# Patient Record
Sex: FEMALE | Race: WHITE | NOT HISPANIC OR LATINO
[De-identification: names, ages, dates, MRNs, and addresses within clinical notes are randomized per-mention and may not be internally consistent; named-entity substitution may affect disease eponyms.]

---

## 2017-09-08 ENCOUNTER — APPOINTMENT (OUTPATIENT)
Dept: FAMILY MEDICINE | Facility: CLINIC | Age: 72
End: 2017-09-08
Payer: MEDICARE

## 2017-09-08 VITALS
DIASTOLIC BLOOD PRESSURE: 60 MMHG | WEIGHT: 197 LBS | SYSTOLIC BLOOD PRESSURE: 128 MMHG | BODY MASS INDEX: 29.86 KG/M2 | HEIGHT: 68 IN

## 2017-09-08 DIAGNOSIS — Z86.39 PERSONAL HISTORY OF OTHER ENDOCRINE, NUTRITIONAL AND METABOLIC DISEASE: ICD-10-CM

## 2017-09-08 DIAGNOSIS — Z87.891 PERSONAL HISTORY OF NICOTINE DEPENDENCE: ICD-10-CM

## 2017-09-08 DIAGNOSIS — Z86.79 PERSONAL HISTORY OF OTHER DISEASES OF THE CIRCULATORY SYSTEM: ICD-10-CM

## 2017-09-08 PROCEDURE — 99203 OFFICE O/P NEW LOW 30 MIN: CPT

## 2017-11-20 ENCOUNTER — MEDICATION RENEWAL (OUTPATIENT)
Age: 72
End: 2017-11-20

## 2017-12-18 ENCOUNTER — APPOINTMENT (OUTPATIENT)
Dept: FAMILY MEDICINE | Facility: CLINIC | Age: 72
End: 2017-12-18
Payer: MEDICARE

## 2017-12-18 VITALS
BODY MASS INDEX: 29.1 KG/M2 | DIASTOLIC BLOOD PRESSURE: 80 MMHG | SYSTOLIC BLOOD PRESSURE: 160 MMHG | WEIGHT: 192 LBS | HEIGHT: 68 IN

## 2017-12-18 PROCEDURE — 36415 COLL VENOUS BLD VENIPUNCTURE: CPT

## 2017-12-18 PROCEDURE — 99214 OFFICE O/P EST MOD 30 MIN: CPT | Mod: 25

## 2017-12-20 ENCOUNTER — MEDICATION RENEWAL (OUTPATIENT)
Age: 72
End: 2017-12-20

## 2017-12-25 ENCOUNTER — RX RENEWAL (OUTPATIENT)
Age: 72
End: 2017-12-25

## 2017-12-26 ENCOUNTER — MEDICATION RENEWAL (OUTPATIENT)
Age: 72
End: 2017-12-26

## 2017-12-28 ENCOUNTER — RX RENEWAL (OUTPATIENT)
Age: 72
End: 2017-12-28

## 2017-12-28 ENCOUNTER — MEDICATION RENEWAL (OUTPATIENT)
Age: 72
End: 2017-12-28

## 2018-01-08 ENCOUNTER — TRANSCRIPTION ENCOUNTER (OUTPATIENT)
Age: 73
End: 2018-01-08

## 2018-03-11 ENCOUNTER — MEDICATION RENEWAL (OUTPATIENT)
Age: 73
End: 2018-03-11

## 2018-03-19 ENCOUNTER — RX RENEWAL (OUTPATIENT)
Age: 73
End: 2018-03-19

## 2018-03-20 ENCOUNTER — APPOINTMENT (OUTPATIENT)
Dept: FAMILY MEDICINE | Facility: CLINIC | Age: 73
End: 2018-03-20
Payer: MEDICARE

## 2018-03-20 ENCOUNTER — MEDICATION RENEWAL (OUTPATIENT)
Age: 73
End: 2018-03-20

## 2018-03-20 VITALS
BODY MASS INDEX: 29.25 KG/M2 | SYSTOLIC BLOOD PRESSURE: 138 MMHG | WEIGHT: 193 LBS | HEIGHT: 68 IN | DIASTOLIC BLOOD PRESSURE: 70 MMHG

## 2018-03-20 PROCEDURE — 36415 COLL VENOUS BLD VENIPUNCTURE: CPT

## 2018-03-20 PROCEDURE — 99214 OFFICE O/P EST MOD 30 MIN: CPT | Mod: 25

## 2018-03-20 RX ORDER — HYDROCHLOROTHIAZIDE 12.5 MG/1
12.5 CAPSULE ORAL DAILY
Qty: 30 | Refills: 0 | Status: DISCONTINUED | COMMUNITY
Start: 2017-12-20 | End: 2018-03-20

## 2018-03-21 ENCOUNTER — RX RENEWAL (OUTPATIENT)
Age: 73
End: 2018-03-21

## 2018-03-23 ENCOUNTER — RX RENEWAL (OUTPATIENT)
Age: 73
End: 2018-03-23

## 2018-03-24 ENCOUNTER — RX RENEWAL (OUTPATIENT)
Age: 73
End: 2018-03-24

## 2018-03-28 ENCOUNTER — TRANSCRIPTION ENCOUNTER (OUTPATIENT)
Age: 73
End: 2018-03-28

## 2018-04-24 ENCOUNTER — RX RENEWAL (OUTPATIENT)
Age: 73
End: 2018-04-24

## 2018-05-30 ENCOUNTER — RX RENEWAL (OUTPATIENT)
Age: 73
End: 2018-05-30

## 2018-06-18 ENCOUNTER — RX RENEWAL (OUTPATIENT)
Age: 73
End: 2018-06-18

## 2018-06-18 ENCOUNTER — MEDICATION RENEWAL (OUTPATIENT)
Age: 73
End: 2018-06-18

## 2018-06-25 ENCOUNTER — APPOINTMENT (OUTPATIENT)
Dept: FAMILY MEDICINE | Facility: CLINIC | Age: 73
End: 2018-06-25
Payer: MEDICARE

## 2018-06-25 VITALS
SYSTOLIC BLOOD PRESSURE: 140 MMHG | WEIGHT: 193 LBS | BODY MASS INDEX: 29.25 KG/M2 | HEIGHT: 68 IN | DIASTOLIC BLOOD PRESSURE: 90 MMHG

## 2018-06-25 PROCEDURE — 36415 COLL VENOUS BLD VENIPUNCTURE: CPT

## 2018-06-25 PROCEDURE — 99214 OFFICE O/P EST MOD 30 MIN: CPT | Mod: 25,Q5

## 2018-06-25 NOTE — ASSESSMENT
[FreeTextEntry1] : Patient had a diagnosis of thyroid disorder. Blood was drawn to assess levels of TSH and T4. Patient will continue on current dose of medications at this time unless instructed otherwise. Patient was advised to take thyroid medications on a empty stomach and to wait at least 45 minutes before eating for appropriate absorption.\par \par The patient has a diagnosis of hypertension. Blood work was drawn in office and will be followed.  The diagnosis was discussed with patient and need for medication compliance and possible side affects and risks of noncompliance. Patient was told to adhere to a low salt diet and try to incorporate exercise daily.\par \par The diagnosis of diabetes is established with this patient.  Blood work was drawn in office and results will be reviewed and followed. The patient was counseled on using a low sugar and carbohydrate diet.  Patient was advised to eat small meals and exercise regularly. Patient as advised to take all medications as prescribed and to follow up with yearly podiatry and opthalmology visits. Patient was advised to call office  or go to the ER immediately if experiences any nausea, lightheadedness or for any other issues.\par \par Smoking cessation was discussed with patient. All options were explained to the patient including available medications and their mechanism of action. Side affects and black box warnings were discussed as well. Patient was counseled on risks and benefits of available medications and risks of continued smoking including death. Greater than 5 minutes spent counseling patient.\par

## 2018-06-25 NOTE — PHYSICAL EXAM
[Well Nourished] : well nourished [Normal Oropharynx] : the oropharynx was normal [Supple] : supple [Clear to Auscultation] : lungs were clear to auscultation bilaterally [Regular Rhythm] : with a regular rhythm [Normal S1, S2] : normal S1 and S2 [Normal Anterior Cervical Nodes] : no anterior cervical lymphadenopathy [No CVA Tenderness] : no CVA  tenderness [No Rash] : no rash [Normal Gait] : normal gait [Normal Insight/Judgement] : insight and judgment were intact

## 2018-06-25 NOTE — HEALTH RISK ASSESSMENT
[No falls in past year] : Patient reported no falls in the past year [0] : 2) Feeling down, depressed, or hopeless: Not at all (0) [] : No [[id number]] : 0

## 2018-06-25 NOTE — HISTORY OF PRESENT ILLNESS
[de-identified] : 73 year old female is here for a followup visit. Patient is here for medication renewals and for blood work discussion. Medications and allergies were reviewed and assessed.  There has been no new medications since the last visit. Patient is feeling well with no active changes or issues since Her last visit.\par

## 2018-06-26 LAB
ALBUMIN SERPL ELPH-MCNC: 4.1 G/DL
ALP BLD-CCNC: 107 U/L
ALT SERPL-CCNC: 8 U/L
ANION GAP SERPL CALC-SCNC: 12 MMOL/L
AST SERPL-CCNC: 13 U/L
BASOPHILS # BLD AUTO: 0.02 K/UL
BASOPHILS NFR BLD AUTO: 0.3 %
BILIRUB SERPL-MCNC: <0.2 MG/DL
BUN SERPL-MCNC: 25 MG/DL
CALCIUM SERPL-MCNC: 9.3 MG/DL
CHLORIDE SERPL-SCNC: 111 MMOL/L
CHOLEST SERPL-MCNC: 220 MG/DL
CHOLEST/HDLC SERPL: 5.1 RATIO
CO2 SERPL-SCNC: 22 MMOL/L
CREAT SERPL-MCNC: 0.94 MG/DL
EOSINOPHIL # BLD AUTO: 0.05 K/UL
EOSINOPHIL NFR BLD AUTO: 0.6 %
GLUCOSE SERPL-MCNC: 103 MG/DL
HBA1C MFR BLD HPLC: 5.8 %
HCT VFR BLD CALC: 34 %
HDLC SERPL-MCNC: 43 MG/DL
HGB BLD-MCNC: 10.6 G/DL
IMM GRANULOCYTES NFR BLD AUTO: 0.3 %
LDLC SERPL CALC-MCNC: 150 MG/DL
LYMPHOCYTES # BLD AUTO: 1.61 K/UL
LYMPHOCYTES NFR BLD AUTO: 20.6 %
MAN DIFF?: NORMAL
MCHC RBC-ENTMCNC: 28.1 PG
MCHC RBC-ENTMCNC: 31.2 GM/DL
MCV RBC AUTO: 90.2 FL
MONOCYTES # BLD AUTO: 0.66 K/UL
MONOCYTES NFR BLD AUTO: 8.4 %
NEUTROPHILS # BLD AUTO: 5.47 K/UL
NEUTROPHILS NFR BLD AUTO: 69.8 %
PLATELET # BLD AUTO: 180 K/UL
POTASSIUM SERPL-SCNC: 4.3 MMOL/L
PROT SERPL-MCNC: 6.7 G/DL
RBC # BLD: 3.77 M/UL
RBC # FLD: 16.5 %
SODIUM SERPL-SCNC: 145 MMOL/L
T4 FREE SERPL-MCNC: 1.1 NG/DL
TRIGL SERPL-MCNC: 137 MG/DL
TSH SERPL-ACNC: 5.82 UIU/ML
WBC # FLD AUTO: 7.83 K/UL

## 2018-07-05 ENCOUNTER — APPOINTMENT (OUTPATIENT)
Dept: FAMILY MEDICINE | Facility: CLINIC | Age: 73
End: 2018-07-05

## 2018-07-13 ENCOUNTER — APPOINTMENT (OUTPATIENT)
Dept: FAMILY MEDICINE | Facility: CLINIC | Age: 73
End: 2018-07-13
Payer: MEDICARE

## 2018-07-13 VITALS
TEMPERATURE: 97.2 F | OXYGEN SATURATION: 96 % | SYSTOLIC BLOOD PRESSURE: 120 MMHG | WEIGHT: 193 LBS | BODY MASS INDEX: 29.25 KG/M2 | DIASTOLIC BLOOD PRESSURE: 76 MMHG | HEART RATE: 55 BPM | HEIGHT: 68 IN

## 2018-07-13 DIAGNOSIS — Z23 ENCOUNTER FOR IMMUNIZATION: ICD-10-CM

## 2018-07-13 LAB — CYTOLOGY CVX/VAG DOC THIN PREP: NORMAL

## 2018-07-13 PROCEDURE — 90670 PCV13 VACCINE IM: CPT | Mod: GY

## 2018-07-13 PROCEDURE — 99214 OFFICE O/P EST MOD 30 MIN: CPT | Mod: 25,Q5

## 2018-07-13 PROCEDURE — G0009: CPT

## 2018-07-13 NOTE — HEALTH RISK ASSESSMENT
[No falls in past year] : Patient reported no falls in the past year [0] : 2) Feeling down, depressed, or hopeless: Not at all (0) [] : No [[id number]] : 0

## 2018-07-13 NOTE — HISTORY OF PRESENT ILLNESS
[de-identified] : 73 year old female is here for a followup visit. Patient is here for medication renewals and for blood work discussion. Medications and allergies were reviewed and assessed.  \par

## 2018-07-13 NOTE — ASSESSMENT
[FreeTextEntry1] : Patient had a diagnosis of thyroid disorder, was increased to 112 and will redraw blood in 4 weeks. Patient will continue on current dose of medications at this time unless instructed otherwise. Patient was advised to take thyroid medications on a empty stomach and to wait at least 45 minutes before eating for appropriate absorption.\par \par The patient has a diagnosis of hypertension. Blood work was drawn in office and will be followed.  The diagnosis was discussed with patient and need for medication compliance and possible side affects and risks of noncompliance. Patient was told to adhere to a low salt diet and try to incorporate exercise daily. well controlled\par \par anemia and low wbc - referred to hematologist\par normal colonoscopy\par \par Patient was given a prevnar 13 vaccine and was counseled regarding all side affects. Patient was advised to ice the area if necessary if it is tender or red. Patient was told to return to office if has any fever, nausea, vomiting or increased pain.\par

## 2018-08-13 ENCOUNTER — APPOINTMENT (OUTPATIENT)
Dept: FAMILY MEDICINE | Facility: CLINIC | Age: 73
End: 2018-08-13
Payer: MEDICARE

## 2018-08-13 VITALS
DIASTOLIC BLOOD PRESSURE: 70 MMHG | SYSTOLIC BLOOD PRESSURE: 120 MMHG | TEMPERATURE: 98.8 F | BODY MASS INDEX: 29.86 KG/M2 | OXYGEN SATURATION: 98 % | HEART RATE: 78 BPM | WEIGHT: 197 LBS | HEIGHT: 68 IN

## 2018-08-13 DIAGNOSIS — B37.9 CANDIDIASIS, UNSPECIFIED: ICD-10-CM

## 2018-08-13 PROCEDURE — 99214 OFFICE O/P EST MOD 30 MIN: CPT | Mod: 25,Q5

## 2018-08-13 PROCEDURE — 36415 COLL VENOUS BLD VENIPUNCTURE: CPT

## 2018-08-13 NOTE — ASSESSMENT
[FreeTextEntry1] : Patient had a diagnosis of thyroid disorder. Blood was drawn to assess levels of TSH and T4. Patient will continue on current dose of medications at this time unless instructed otherwise. Patient was advised to take thyroid medications on a empty stomach and to wait at least 45 minutes before eating for appropriate absorption.\par has been on increased synthroid to 112 - will check levels\par \par The patient has a diagnosis of hypertension. Blood work was drawn in office and will be followed.  The diagnosis was discussed with patient and need for medication compliance and possible side affects and risks of noncompliance. Patient was told to adhere to a low salt diet and try to incorporate exercise daily. well controlled\par \par diabetes - patient getting symptoms of hypoglycemia - will wean off glipizide and reassess in 3 months\par \par anemia and low wbc - referred to hematologist - most likely due to age\par normal colonoscopy\par \par \par

## 2018-08-13 NOTE — HEALTH RISK ASSESSMENT
[] : No [No falls in past year] : Patient reported no falls in the past year [0] : 2) Feeling down, depressed, or hopeless: Not at all (0) [[id number]] : 0

## 2018-08-13 NOTE — HISTORY OF PRESENT ILLNESS
[de-identified] : 73 year old female is here for a followup visit. Patient is here for medication renewals and for blood work discussion. Medications and allergies were reviewed and assessed.  \par has followed to hematologist - \par

## 2018-08-14 ENCOUNTER — MEDICATION RENEWAL (OUTPATIENT)
Age: 73
End: 2018-08-14

## 2018-08-14 LAB
T4 FREE SERPL-MCNC: 1.1 NG/DL
TSH SERPL-ACNC: 4.18 UIU/ML

## 2018-09-14 ENCOUNTER — MEDICATION RENEWAL (OUTPATIENT)
Age: 73
End: 2018-09-14

## 2018-10-14 ENCOUNTER — RX RENEWAL (OUTPATIENT)
Age: 73
End: 2018-10-14

## 2018-11-05 ENCOUNTER — APPOINTMENT (OUTPATIENT)
Dept: FAMILY MEDICINE | Facility: CLINIC | Age: 73
End: 2018-11-05
Payer: MEDICARE

## 2018-11-05 VITALS
BODY MASS INDEX: 29.86 KG/M2 | OXYGEN SATURATION: 97 % | HEART RATE: 74 BPM | HEIGHT: 68 IN | WEIGHT: 197 LBS | DIASTOLIC BLOOD PRESSURE: 60 MMHG | SYSTOLIC BLOOD PRESSURE: 120 MMHG | RESPIRATION RATE: 16 BRPM

## 2018-11-05 DIAGNOSIS — F17.200 NICOTINE DEPENDENCE, UNSPECIFIED, UNCOMPLICATED: ICD-10-CM

## 2018-11-05 PROCEDURE — 99215 OFFICE O/P EST HI 40 MIN: CPT | Mod: 25,Q5

## 2018-11-05 PROCEDURE — 90686 IIV4 VACC NO PRSV 0.5 ML IM: CPT

## 2018-11-05 PROCEDURE — G0008: CPT

## 2018-11-05 PROCEDURE — 36415 COLL VENOUS BLD VENIPUNCTURE: CPT

## 2018-11-05 NOTE — ASSESSMENT
[FreeTextEntry1] : Patient had a diagnosis of thyroid disorder. Patient will continue on current dose of medications at this time unless instructed otherwise. Patient was advised to take thyroid medications on a empty stomach and to wait at least 45 minutes before eating for appropriate absorption.\par has been on increased synthroid to 112 -\par \par \par diabetes - patient getting symptoms of hypoglycemia - only taking three days a week\par will recheck today\par \par anemia and low wbc - referred to hematologist - most likely due to age\par normal colonoscopy\par \par depression - patient reports not wanting to do things anymore\par feels like has no purpose\par will add on abilify\par \par Smoking cessation was discussed with patient. All options were explained to the patient including available medications and their mechanism of action. Side affects and black box warnings were discussed as well. Patient was counseled on risks and benefits of available medications and risks of continued smoking including death. Greater than 5 minutes spent counseling patient.\par suggest stopping to possibly increase\par \par Patient was given a vaccine and was counseled regarding all side affects. Patient was advised to ice the area if necessary if it is tender or red. Patient was told to return to office if has any fever, nausea, vomiting or increased pain.\par \par \par

## 2018-11-05 NOTE — HEALTH RISK ASSESSMENT
[No falls in past year] : Patient reported no falls in the past year [3] : 2) Feeling down, depressed, or hopeless for nearly every day (3) [] : No [[id number]] : 6 [[id number]] : 16

## 2018-11-05 NOTE — PHYSICAL EXAM
[Well Nourished] : well nourished [Normal Oropharynx] : the oropharynx was normal [Supple] : supple [Clear to Auscultation] : lungs were clear to auscultation bilaterally [Regular Rhythm] : with a regular rhythm [Normal S1, S2] : normal S1 and S2 [Normal Anterior Cervical Nodes] : no anterior cervical lymphadenopathy [No CVA Tenderness] : no CVA  tenderness [No Rash] : no rash [Normal Gait] : normal gait [de-identified] : sad

## 2018-11-05 NOTE — HISTORY OF PRESENT ILLNESS
[Stopped use since ___] : No tobacco use since [unfilled] [Cigarettes ___ packs/day] : Patient smokes [unfilled] packs of cigarettes per day [___ Year(s)] : [unfilled] year(s) ago [Discussed Risks and Advised to Quit Smoking] : Discussed risks and advised to quit smoking [Smoking Cessation Counseling Given (more than 3 min less than10 min) and Resources Provided] : Smoking cessation counseling given (more than 3 min less than 10 min) and resources provided [Not Ready] : Patient is not ready for cessation intervention [Contemplation] : Contemplation: patient is considering quitting within the next 6 months, patient did not attempt to quit in the last year [de-identified] : 73 year old female is here for a followup visit for depression. Medications and allergies were reviewed and assessed.  \par has followed to hematologist - \par patient feeling that she is depressed, does not want to do things, \par

## 2018-11-06 LAB
ALBUMIN SERPL ELPH-MCNC: 4 G/DL
ALP BLD-CCNC: 106 U/L
ALT SERPL-CCNC: 9 U/L
ANION GAP SERPL CALC-SCNC: 12 MMOL/L
AST SERPL-CCNC: 13 U/L
BASOPHILS # BLD AUTO: 0.02 K/UL
BASOPHILS NFR BLD AUTO: 0.3 %
BILIRUB SERPL-MCNC: 0.2 MG/DL
BUN SERPL-MCNC: 24 MG/DL
CALCIUM SERPL-MCNC: 9.6 MG/DL
CHLORIDE SERPL-SCNC: 106 MMOL/L
CHOLEST SERPL-MCNC: 199 MG/DL
CHOLEST/HDLC SERPL: 5.7 RATIO
CO2 SERPL-SCNC: 25 MMOL/L
CREAT SERPL-MCNC: 1.17 MG/DL
EOSINOPHIL # BLD AUTO: 0.07 K/UL
EOSINOPHIL NFR BLD AUTO: 1 %
GLUCOSE SERPL-MCNC: 129 MG/DL
HBA1C MFR BLD HPLC: 5.7 %
HCT VFR BLD CALC: 37.1 %
HDLC SERPL-MCNC: 35 MG/DL
HGB BLD-MCNC: 11.6 G/DL
IMM GRANULOCYTES NFR BLD AUTO: 0.1 %
LDLC SERPL CALC-MCNC: 132 MG/DL
LYMPHOCYTES # BLD AUTO: 1.28 K/UL
LYMPHOCYTES NFR BLD AUTO: 18.7 %
MAN DIFF?: NORMAL
MCHC RBC-ENTMCNC: 28 PG
MCHC RBC-ENTMCNC: 31.3 GM/DL
MCV RBC AUTO: 89.6 FL
MONOCYTES # BLD AUTO: 0.5 K/UL
MONOCYTES NFR BLD AUTO: 7.3 %
NEUTROPHILS # BLD AUTO: 4.96 K/UL
NEUTROPHILS NFR BLD AUTO: 72.6 %
PLATELET # BLD AUTO: 156 K/UL
POTASSIUM SERPL-SCNC: 4.3 MMOL/L
PROT SERPL-MCNC: 6.8 G/DL
RBC # BLD: 4.14 M/UL
RBC # FLD: 16.9 %
SODIUM SERPL-SCNC: 143 MMOL/L
T4 FREE SERPL-MCNC: 1.2 NG/DL
TRIGL SERPL-MCNC: 160 MG/DL
TSH SERPL-ACNC: 3.68 UIU/ML
WBC # FLD AUTO: 6.84 K/UL

## 2018-11-06 RX ORDER — GLIPIZIDE 5 MG/1
5 TABLET, FILM COATED, EXTENDED RELEASE ORAL DAILY
Qty: 90 | Refills: 0 | Status: DISCONTINUED | COMMUNITY
Start: 2017-11-20 | End: 2018-11-06

## 2018-11-07 RX ORDER — ARIPIPRAZOLE 5 MG/1
5 TABLET ORAL DAILY
Qty: 30 | Refills: 0 | Status: DISCONTINUED | COMMUNITY
Start: 2018-11-05 | End: 2018-11-07

## 2018-11-19 ENCOUNTER — APPOINTMENT (OUTPATIENT)
Dept: FAMILY MEDICINE | Facility: CLINIC | Age: 73
End: 2018-11-19
Payer: MEDICARE

## 2018-11-19 VITALS
HEIGHT: 68 IN | WEIGHT: 197 LBS | HEART RATE: 74 BPM | RESPIRATION RATE: 16 BRPM | SYSTOLIC BLOOD PRESSURE: 120 MMHG | OXYGEN SATURATION: 97 % | BODY MASS INDEX: 29.86 KG/M2 | DIASTOLIC BLOOD PRESSURE: 60 MMHG

## 2018-11-19 PROCEDURE — 99214 OFFICE O/P EST MOD 30 MIN: CPT | Mod: Q5

## 2018-11-19 NOTE — HISTORY OF PRESENT ILLNESS
[de-identified] : 73 year old female is here for a followup visit for depression. Medications and allergies were reviewed and assessed.  \par has followed to hematologist - \par patient feeling that she is depressed, does not want to do things, was placed on wellubtrin 150 and feels little to no difference\par  [Stopped use since ___] : No tobacco use since [unfilled] [Cigarettes ___ packs/day] : Patient smokes [unfilled] packs of cigarettes per day [___ Year(s)] : [unfilled] year(s) ago [Discussed Risks and Advised to Quit Smoking] : Discussed risks and advised to quit smoking [Not Ready] : Patient is not ready for cessation intervention [Contemplation] : Contemplation: patient is considering quitting within the next 6 months, patient did not attempt to quit in the last year

## 2018-11-19 NOTE — HEALTH RISK ASSESSMENT
[] : No [No falls in past year] : Patient reported no falls in the past year [3] : 2) Feeling down, depressed, or hopeless for nearly every day (3) [[id number]] : 6 [XLS5Elcyu] : 16

## 2018-11-19 NOTE — PHYSICAL EXAM
[Well Nourished] : well nourished [Normal Oropharynx] : the oropharynx was normal [Supple] : supple [Clear to Auscultation] : lungs were clear to auscultation bilaterally [Regular Rhythm] : with a regular rhythm [Normal S1, S2] : normal S1 and S2 [Normal Anterior Cervical Nodes] : no anterior cervical lymphadenopathy [No CVA Tenderness] : no CVA  tenderness [No Rash] : no rash [Normal Gait] : normal gait [de-identified] : sad

## 2018-11-19 NOTE — ASSESSMENT
[FreeTextEntry1] : Patient had a diagnosis of thyroid disorder. Patient will continue on current dose of medications at this time unless instructed otherwise. Patient was advised to take thyroid medications on a empty stomach and to wait at least 45 minutes before eating for appropriate absorption.\par has been on increased synthroid to 112 -\par \par \par diabetes - patient was getting symptoms of hypoglycemia - \par stopped sulfonurea - feels better\par will recheck in 3 months\par \par anemia and low wbc - referred to hematologist - most likely due to age\par normal colonoscopy\par \par depression - patient reports not wanting to do things anymore\par feels like has no purpose- has horrible stomach issues and will not see doctor but cannot leave the house\par added wellubrirn 150, little to no difference, will increase to 300\par abilify was not covered\par \par Smoking cessation was discussed with patient. All options were explained to the patient including available medications and their mechanism of action. Side affects and black box warnings were discussed as well. Patient was counseled on risks and benefits of available medications and risks of continued smoking including death. Greater than 5 minutes spent counseling patient.\par suggest stopping to possibly increase\par \par \par \par \par

## 2018-12-31 ENCOUNTER — RX RENEWAL (OUTPATIENT)
Age: 73
End: 2018-12-31

## 2019-01-02 ENCOUNTER — RX RENEWAL (OUTPATIENT)
Age: 74
End: 2019-01-02

## 2019-02-04 ENCOUNTER — APPOINTMENT (OUTPATIENT)
Dept: FAMILY MEDICINE | Facility: CLINIC | Age: 74
End: 2019-02-04
Payer: MEDICARE

## 2019-02-04 ENCOUNTER — RESULT CHARGE (OUTPATIENT)
Age: 74
End: 2019-02-04

## 2019-02-04 ENCOUNTER — APPOINTMENT (OUTPATIENT)
Dept: CARDIOLOGY | Facility: CLINIC | Age: 74
End: 2019-02-04
Payer: MEDICARE

## 2019-02-04 VITALS — DIASTOLIC BLOOD PRESSURE: 60 MMHG | SYSTOLIC BLOOD PRESSURE: 162 MMHG

## 2019-02-04 VITALS — DIASTOLIC BLOOD PRESSURE: 58 MMHG | SYSTOLIC BLOOD PRESSURE: 180 MMHG

## 2019-02-04 LAB — GLUCOSE BLDC GLUCOMTR-MCNC: 151

## 2019-02-04 PROCEDURE — 93000 ELECTROCARDIOGRAM COMPLETE: CPT

## 2019-02-04 PROCEDURE — 99214 OFFICE O/P EST MOD 30 MIN: CPT | Mod: Q5

## 2019-02-04 PROCEDURE — 99204 OFFICE O/P NEW MOD 45 MIN: CPT

## 2019-02-04 NOTE — DISCUSSION/SUMMARY
[FreeTextEntry1] : 73F with CAD, DM, HTN, HLD who presents for evaluation of feeling tired, nausea, lightheaded.  Notes she had some chest discomfort overnight which she attributed to GERD.\par \par The patient expresses some concerning symptoms for angina, especially given her CAD history.  While her prior episodes felt different, I am concerned that her fatigue, lightheadedness, and episode of chest pain overnight could be related to coronary ischemia.  At the same time, the patient appears quite comfortable on exam.  Other than HTN, VSS and ECG is not notable for any ischemia. Difficult to elicit a clear history from the patient. \par \par Given the above and my concern for CAD, recommend that she go straight to ER for rule out MI and possible ischemic evaluation.  She refuses to go by ambulance, and states that her  will take her when he gets home from work in the afternoon.  I warned her of the dangers of delaying her ER visit, but she is aware of the risks including but not limed to MI, arrhythmia, CHF, and death. She states that she still prefers to wait for her , and she is feeling better as of the end of this visit. \par \par Will follow up.

## 2019-02-04 NOTE — PHYSICAL EXAM
[Well Nourished] : well nourished [Normal Oropharynx] : the oropharynx was normal [Supple] : supple [Clear to Auscultation] : lungs were clear to auscultation bilaterally [Regular Rhythm] : with a regular rhythm [Normal S1, S2] : normal S1 and S2 [Normal Anterior Cervical Nodes] : no anterior cervical lymphadenopathy [No CVA Tenderness] : no CVA  tenderness [No Rash] : no rash [Normal Gait] : normal gait [de-identified] : sad

## 2019-02-04 NOTE — ASSESSMENT
[FreeTextEntry1] : Patient had a diagnosis of thyroid disorder. Patient will continue on current dose of medications at this time unless instructed otherwise. Patient was advised to take thyroid medications on a empty stomach and to wait at least 45 minutes before eating for appropriate absorption.\par has been on increased synthroid to 112 -\par \par \par diabetes - patient was getting symptoms of hypoglycemia - \par stopped sulfonurea - feels better\par \par \par anemia and low wbc - referred to hematologist - most likely due to age\par normal colonoscopy\par \par depression - patient reports not wanting to do things anymore\par feels like has no purpose- has horrible stomach issues and will not see doctor but cannot leave the house\par did not take the wellubtrin, \par \par Smoking cessation was discussed with patient. All options were explained to the patient including available medications and their mechanism of action. Side affects and black box warnings were discussed as well. Patient was counseled on risks and benefits of available medications and risks of continued smoking including death. Greater than 5 minutes spent counseling patient.\par suggest stopping to possibly increase\par \par sent directly up to cardiology for assessment of feeling lightheadedness and blood pressure\par

## 2019-02-04 NOTE — PHYSICAL EXAM
[General Appearance - Well Developed] : well developed [Well Groomed] : well groomed [General Appearance - Well Nourished] : well nourished [No Deformities] : no deformities [General Appearance - In No Acute Distress] : no acute distress [Normal Conjunctiva] : the conjunctiva exhibited no abnormalities [Eyelids - No Xanthelasma] : the eyelids demonstrated no xanthelasmas [Normal Oral Mucosa] : normal oral mucosa [No Oral Pallor] : no oral pallor [No Oral Cyanosis] : no oral cyanosis [Normal Jugular Venous A Waves Present] : normal jugular venous A waves present [Normal Jugular Venous V Waves Present] : normal jugular venous V waves present [No Jugular Venous Mahan A Waves] : no jugular venous mahan A waves [Heart Rate And Rhythm] : heart rate and rhythm were normal [Heart Sounds] : normal S1 and S2 [Edema] : no peripheral edema present [Respiration, Rhythm And Depth] : normal respiratory rhythm and effort [Exaggerated Use Of Accessory Muscles For Inspiration] : no accessory muscle use [Auscultation Breath Sounds / Voice Sounds] : lungs were clear to auscultation bilaterally [Chest Palpation] : palpation of the chest revealed no abnormalities [Abdomen Soft] : soft [Abdomen Tenderness] : non-tender [Abdomen Mass (___ Cm)] : no abdominal mass palpated [Abnormal Walk] : normal gait [Gait - Sufficient For Exercise Testing] : the gait was sufficient for exercise testing [Nail Clubbing] : no clubbing of the fingernails [Cyanosis, Localized] : no localized cyanosis [Petechial Hemorrhages (___cm)] : no petechial hemorrhages [Skin Color & Pigmentation] : normal skin color and pigmentation [] : no rash [No Venous Stasis] : no venous stasis [Skin Lesions] : no skin lesions [No Skin Ulcers] : no skin ulcer [No Xanthoma] : no  xanthoma was observed [Oriented To Time, Place, And Person] : oriented to person, place, and time [Affect] : the affect was normal [Mood] : the mood was normal [No Anxiety] : not feeling anxious [FreeTextEntry1] : 2/6 systolic murmur at upper sternal border

## 2019-02-04 NOTE — HISTORY OF PRESENT ILLNESS
[FreeTextEntry1] : 73F with HTN, DM, HLD, CAD s/p multiple stents ([facility] - last 2013) who presents from PCP with feeling of lightheadedness, nausea, feeling like she wants to pass out.  She has been feeling this way for several months, on and off.  Her whole body feels limp. Before she got her previous stents, she had felt a shooting pain down both arms.  She felt that way one time one month ago but she has not felt that way since then. She is unable to further specify her symptoms. Notes some chest pain last night which she attributed to GERD.  Not currently feeling this pain. \par \par Former smoker, (quit 4 weeks ago). \par \par ECG: SR with RBBB, nonspecific ST-T wave changes\par Lipids: 199/35/132/160

## 2019-02-04 NOTE — HEALTH RISK ASSESSMENT
[] : No [No falls in past year] : Patient reported no falls in the past year [3] : 2) Feeling down, depressed, or hopeless for nearly every day (3) [[id number]] : 6 [IBQ2Hgzfc] : 16

## 2019-02-04 NOTE — HISTORY OF PRESENT ILLNESS
[de-identified] : 73 year old female is here for a followup visit for depression and not feeling well today Medications and allergies were reviewed and assessed.  \par has followed to hematologist - \par \par  [Stopped use since ___] : No tobacco use since [unfilled] [Cigarettes ___ packs/day] : Patient smokes [unfilled] packs of cigarettes per day [___ Year(s)] : [unfilled] year(s) ago [Discussed Risks and Advised to Quit Smoking] : Discussed risks and advised to quit smoking [Not Ready] : Patient is not ready for cessation intervention [Contemplation] : Contemplation: patient is considering quitting within the next 6 months, patient did not attempt to quit in the last year

## 2019-02-08 ENCOUNTER — APPOINTMENT (OUTPATIENT)
Dept: CARDIOLOGY | Facility: CLINIC | Age: 74
End: 2019-02-08
Payer: MEDICARE

## 2019-02-08 VITALS
WEIGHT: 191 LBS | HEIGHT: 68 IN | SYSTOLIC BLOOD PRESSURE: 148 MMHG | BODY MASS INDEX: 28.95 KG/M2 | TEMPERATURE: 98.1 F | OXYGEN SATURATION: 96 % | HEART RATE: 57 BPM | DIASTOLIC BLOOD PRESSURE: 56 MMHG | RESPIRATION RATE: 16 BRPM

## 2019-02-08 PROCEDURE — 36415 COLL VENOUS BLD VENIPUNCTURE: CPT

## 2019-02-08 PROCEDURE — 99214 OFFICE O/P EST MOD 30 MIN: CPT

## 2019-02-08 NOTE — HISTORY OF PRESENT ILLNESS
[FreeTextEntry1] : 73F with HTN, DM, HLD, CAD s/p multiple stents (Counts include 234 beds at the [facility] - last 2013) who presents for follow up after being seen earlier in the week for lightheadedness, nausea, sporadic CP.  Pt was advised to go to ER to [facility] on last visit but patient refused and never went. Pt spoke to a friend who is a cardiologist who told her she did not need to go to ER, but she should follow up for an ischemic work up. Currently feels well, symptoms have resolved.  Much calmer today without chest pain. \par \par Former smoker, (quit 4 weeks ago). \par \par ECG: SR with RBBB, nonspecific ST-T wave changes\par Lipids: 199/35/132/160

## 2019-02-08 NOTE — PHYSICAL EXAM
[General Appearance - Well Developed] : well developed [Well Groomed] : well groomed [General Appearance - Well Nourished] : well nourished [No Deformities] : no deformities [General Appearance - In No Acute Distress] : no acute distress [Normal Conjunctiva] : the conjunctiva exhibited no abnormalities [Eyelids - No Xanthelasma] : the eyelids demonstrated no xanthelasmas [Normal Oral Mucosa] : normal oral mucosa [No Oral Pallor] : no oral pallor [No Oral Cyanosis] : no oral cyanosis [Normal Jugular Venous A Waves Present] : normal jugular venous A waves present [Normal Jugular Venous V Waves Present] : normal jugular venous V waves present [No Jugular Venous Mahan A Waves] : no jugular venous mahan A waves [Respiration, Rhythm And Depth] : normal respiratory rhythm and effort [Exaggerated Use Of Accessory Muscles For Inspiration] : no accessory muscle use [Auscultation Breath Sounds / Voice Sounds] : lungs were clear to auscultation bilaterally [Chest Palpation] : palpation of the chest revealed no abnormalities [Heart Rate And Rhythm] : heart rate and rhythm were normal [Heart Sounds] : normal S1 and S2 [Edema] : no peripheral edema present [Abdomen Soft] : soft [Abdomen Tenderness] : non-tender [Abdomen Mass (___ Cm)] : no abdominal mass palpated [Abnormal Walk] : normal gait [Gait - Sufficient For Exercise Testing] : the gait was sufficient for exercise testing [Nail Clubbing] : no clubbing of the fingernails [Cyanosis, Localized] : no localized cyanosis [Petechial Hemorrhages (___cm)] : no petechial hemorrhages [Skin Color & Pigmentation] : normal skin color and pigmentation [] : no rash [No Venous Stasis] : no venous stasis [Skin Lesions] : no skin lesions [No Skin Ulcers] : no skin ulcer [No Xanthoma] : no  xanthoma was observed [Oriented To Time, Place, And Person] : oriented to person, place, and time [Affect] : the affect was normal [Mood] : the mood was normal [No Anxiety] : not feeling anxious [FreeTextEntry1] : 2/6 systolic murmur at upper sternal border

## 2019-02-08 NOTE — REVIEW OF SYSTEMS
[Feeling Fatigued] : feeling fatigued [Dizziness] : dizziness [Negative] : Heme/Lymph [Shortness Of Breath] : no shortness of breath [Chest  Pressure] : no chest pressure [Chest Pain] : no chest pain [Cough] : no cough

## 2019-02-10 LAB
ALBUMIN SERPL ELPH-MCNC: 3.9 G/DL
ALP BLD-CCNC: 103 U/L
ALT SERPL-CCNC: 8 U/L
ANION GAP SERPL CALC-SCNC: 12 MMOL/L
AST SERPL-CCNC: 16 U/L
BASOPHILS # BLD AUTO: 0.02 K/UL
BASOPHILS NFR BLD AUTO: 0.3 %
BILIRUB SERPL-MCNC: <0.2 MG/DL
BUN SERPL-MCNC: 27 MG/DL
CALCIUM SERPL-MCNC: 9 MG/DL
CHLORIDE SERPL-SCNC: 108 MMOL/L
CHOLEST SERPL-MCNC: 212 MG/DL
CHOLEST/HDLC SERPL: 4.6 RATIO
CO2 SERPL-SCNC: 25 MMOL/L
CREAT SERPL-MCNC: 0.97 MG/DL
EOSINOPHIL # BLD AUTO: 0.11 K/UL
EOSINOPHIL NFR BLD AUTO: 1.5 %
GLUCOSE SERPL-MCNC: 145 MG/DL
HBA1C MFR BLD HPLC: 6 %
HCT VFR BLD CALC: 35 %
HDLC SERPL-MCNC: 46 MG/DL
HGB BLD-MCNC: 10.3 G/DL
IMM GRANULOCYTES NFR BLD AUTO: 0.1 %
LDLC SERPL CALC-MCNC: 142 MG/DL
LYMPHOCYTES # BLD AUTO: 1.5 K/UL
LYMPHOCYTES NFR BLD AUTO: 20 %
MAN DIFF?: NORMAL
MCHC RBC-ENTMCNC: 28.2 PG
MCHC RBC-ENTMCNC: 29.4 GM/DL
MCV RBC AUTO: 95.9 FL
MONOCYTES # BLD AUTO: 0.58 K/UL
MONOCYTES NFR BLD AUTO: 7.7 %
NEUTROPHILS # BLD AUTO: 5.27 K/UL
NEUTROPHILS NFR BLD AUTO: 70.4 %
PLATELET # BLD AUTO: 167 K/UL
POTASSIUM SERPL-SCNC: 5 MMOL/L
PROT SERPL-MCNC: 6.4 G/DL
RBC # BLD: 3.65 M/UL
RBC # FLD: 16.4 %
SODIUM SERPL-SCNC: 145 MMOL/L
TRIGL SERPL-MCNC: 119 MG/DL
TSH SERPL-ACNC: 5.49 UIU/ML
WBC # FLD AUTO: 7.49 K/UL

## 2019-02-15 ENCOUNTER — APPOINTMENT (OUTPATIENT)
Dept: INTERNAL MEDICINE | Facility: CLINIC | Age: 74
End: 2019-02-15
Payer: MEDICARE

## 2019-02-15 PROCEDURE — 93880 EXTRACRANIAL BILAT STUDY: CPT

## 2019-02-15 PROCEDURE — 93306 TTE W/DOPPLER COMPLETE: CPT

## 2019-02-18 ENCOUNTER — MEDICATION RENEWAL (OUTPATIENT)
Age: 74
End: 2019-02-18

## 2019-02-20 ENCOUNTER — APPOINTMENT (OUTPATIENT)
Dept: CARDIOLOGY | Facility: CLINIC | Age: 74
End: 2019-02-20
Payer: MEDICARE

## 2019-02-20 ENCOUNTER — MED ADMIN CHARGE (OUTPATIENT)
Age: 74
End: 2019-02-20

## 2019-02-20 DIAGNOSIS — R94.31 ABNORMAL ELECTROCARDIOGRAM [ECG] [EKG]: ICD-10-CM

## 2019-02-20 PROCEDURE — 93015 CV STRESS TEST SUPVJ I&R: CPT

## 2019-02-20 PROCEDURE — A9500: CPT

## 2019-02-20 PROCEDURE — 78452 HT MUSCLE IMAGE SPECT MULT: CPT

## 2019-02-20 RX ORDER — REGADENOSON 0.08 MG/ML
0.4 INJECTION, SOLUTION INTRAVENOUS
Qty: 1 | Refills: 0 | Status: COMPLETED | OUTPATIENT
Start: 2019-02-20

## 2019-02-20 RX ADMIN — REGADENOSON 4 MG/5ML: 0.08 INJECTION, SOLUTION INTRAVENOUS at 00:00

## 2019-02-27 ENCOUNTER — RX RENEWAL (OUTPATIENT)
Age: 74
End: 2019-02-27

## 2019-03-05 ENCOUNTER — RX RENEWAL (OUTPATIENT)
Age: 74
End: 2019-03-05

## 2019-03-11 ENCOUNTER — RX RENEWAL (OUTPATIENT)
Age: 74
End: 2019-03-11

## 2019-05-09 ENCOUNTER — TRANSCRIPTION ENCOUNTER (OUTPATIENT)
Age: 74
End: 2019-05-09

## 2019-05-10 ENCOUNTER — APPOINTMENT (OUTPATIENT)
Dept: CARDIOLOGY | Facility: CLINIC | Age: 74
End: 2019-05-10
Payer: MEDICARE

## 2019-05-10 VITALS
HEART RATE: 61 BPM | HEIGHT: 68 IN | SYSTOLIC BLOOD PRESSURE: 165 MMHG | BODY MASS INDEX: 29.86 KG/M2 | DIASTOLIC BLOOD PRESSURE: 66 MMHG | OXYGEN SATURATION: 96 % | WEIGHT: 197 LBS

## 2019-05-10 PROCEDURE — 99214 OFFICE O/P EST MOD 30 MIN: CPT

## 2019-05-10 NOTE — DISCUSSION/SUMMARY
[FreeTextEntry1] : 73F with CAD, DM, HTN, HLD, carotid stenosis, who presents for follow up of CP.  Pt feeling well.\par \par 1. CAD:  Cont asa, plavix. Infarct without ischemia on recent stress. Nonobstructive carotid disease. Cont statin. \par \par 2. HTN: BP elevated. Pt very worked up in the office. Cont atenolol, amlodipine, irbesartan. \par \par RV 3 months

## 2019-05-10 NOTE — HISTORY OF PRESENT ILLNESS
[FreeTextEntry1] : 73F with HTN, DM, HLD, CAD s/p multiple stents (Replaced by [facility] - last 2013) who presents for follow up of CP. Had been seen in Feb 2019 with chest pain, there was concern for ischemia, pt deferred ER visit and ended up with nuclear stress test which showed infarct but no ischemia.  Pt currently feels well without CP, SOB.  Had an episode of dizziness for several days about a month ago which self resolved and the patient attributed to vertigo. \par \par Former smoker, (quit Jan 2019). \par \par ECG: SR with RBBB, nonspecific ST-T wave changes\par TTE 2/2019: Normal LV function, severe concentric LVH, MAC, mild MS, mild AS, mild LAE\par NST: EF 53%, moderate fixed defect anterior wall, small mild defect basal inferior, basal inferoseptal walls\par Carotid 2/2019: Bilateral <50% ICA stenosis, R ECA >50% stenosis \par Lipids: 199/35/132/160\par \par Norvasc 10mg, atenolol 100mg, plavix 75mg, irbesartan 300mg, simvastatin 40mg

## 2019-05-10 NOTE — PHYSICAL EXAM
[General Appearance - Well Developed] : well developed [General Appearance - Well Nourished] : well nourished [No Deformities] : no deformities [Well Groomed] : well groomed [Normal Conjunctiva] : the conjunctiva exhibited no abnormalities [Eyelids - No Xanthelasma] : the eyelids demonstrated no xanthelasmas [General Appearance - In No Acute Distress] : no acute distress [No Oral Cyanosis] : no oral cyanosis [No Oral Pallor] : no oral pallor [Normal Oral Mucosa] : normal oral mucosa [Normal Jugular Venous V Waves Present] : normal jugular venous V waves present [Normal Jugular Venous A Waves Present] : normal jugular venous A waves present [Respiration, Rhythm And Depth] : normal respiratory rhythm and effort [No Jugular Venous Mahan A Waves] : no jugular venous mahan A waves [Exaggerated Use Of Accessory Muscles For Inspiration] : no accessory muscle use [Auscultation Breath Sounds / Voice Sounds] : lungs were clear to auscultation bilaterally [Chest Palpation] : palpation of the chest revealed no abnormalities [Edema] : no peripheral edema present [FreeTextEntry1] : 2/6 systolic murmur at upper sternal border [Heart Rate And Rhythm] : heart rate and rhythm were normal [Heart Sounds] : normal S1 and S2 [Abdomen Tenderness] : non-tender [Abdomen Soft] : soft [Abnormal Walk] : normal gait [Abdomen Mass (___ Cm)] : no abdominal mass palpated [Gait - Sufficient For Exercise Testing] : the gait was sufficient for exercise testing [Petechial Hemorrhages (___cm)] : no petechial hemorrhages [Cyanosis, Localized] : no localized cyanosis [Nail Clubbing] : no clubbing of the fingernails [] : no ischemic changes [Skin Color & Pigmentation] : normal skin color and pigmentation [Skin Lesions] : no skin lesions [No Skin Ulcers] : no skin ulcer [No Venous Stasis] : no venous stasis [Oriented To Time, Place, And Person] : oriented to person, place, and time [Affect] : the affect was normal [No Xanthoma] : no  xanthoma was observed [Mood] : the mood was normal [No Anxiety] : not feeling anxious

## 2019-05-10 NOTE — REVIEW OF SYSTEMS
[Shortness Of Breath] : no shortness of breath [Feeling Fatigued] : not feeling fatigued [Chest  Pressure] : no chest pressure [Cough] : no cough [Chest Pain] : no chest pain [Dizziness] : dizziness [Negative] : Heme/Lymph

## 2019-05-20 ENCOUNTER — APPOINTMENT (OUTPATIENT)
Dept: FAMILY MEDICINE | Facility: CLINIC | Age: 74
End: 2019-05-20
Payer: MEDICARE

## 2019-05-20 VITALS — DIASTOLIC BLOOD PRESSURE: 62 MMHG | SYSTOLIC BLOOD PRESSURE: 140 MMHG

## 2019-05-20 DIAGNOSIS — H81.10 BENIGN PAROXYSMAL VERTIGO, UNSPECIFIED EAR: ICD-10-CM

## 2019-05-20 PROCEDURE — G0438: CPT

## 2019-05-20 PROCEDURE — 36415 COLL VENOUS BLD VENIPUNCTURE: CPT

## 2019-05-20 RX ORDER — DOXAZOSIN 8 MG/1
8 TABLET ORAL DAILY
Qty: 90 | Refills: 0 | Status: DISCONTINUED | COMMUNITY
Start: 2017-12-20 | End: 2019-05-20

## 2019-05-20 RX ORDER — TERAZOSIN 10 MG/1
10 CAPSULE ORAL
Qty: 90 | Refills: 1 | Status: DISCONTINUED | COMMUNITY
Start: 2019-02-05 | End: 2019-05-20

## 2019-05-20 RX ORDER — CLOPIDOGREL BISULFATE 75 MG/1
75 TABLET, FILM COATED ORAL
Qty: 90 | Refills: 1 | Status: DISCONTINUED | COMMUNITY
Start: 2017-12-20 | End: 2019-05-20

## 2019-05-20 RX ORDER — BLOOD SUGAR DIAGNOSTIC
STRIP MISCELLANEOUS DAILY
Qty: 100 | Refills: 0 | Status: DISCONTINUED | COMMUNITY
Start: 2017-12-20 | End: 2019-05-20

## 2019-05-20 RX ORDER — BUPROPION HYDROCHLORIDE 300 MG/1
300 TABLET, EXTENDED RELEASE ORAL
Qty: 90 | Refills: 1 | Status: DISCONTINUED | COMMUNITY
Start: 2018-11-07 | End: 2019-05-20

## 2019-05-20 RX ORDER — DIPHENOXYLATE HYDROCHLORIDE AND ATROPINE SULFATE 2.5; .025 MG/1; MG/1
2.5-0.025 TABLET ORAL 3 TIMES DAILY
Refills: 0 | Status: DISCONTINUED | COMMUNITY
Start: 2017-12-20 | End: 2019-05-20

## 2019-05-20 RX ORDER — DIAZEPAM 5 MG/1
5 TABLET ORAL
Qty: 30 | Refills: 0 | Status: DISCONTINUED | COMMUNITY
Start: 2018-06-25 | End: 2019-05-20

## 2019-05-20 NOTE — HEALTH RISK ASSESSMENT
[Fair] :  ~his/her~ mood as fair [No falls in past year] : Patient reported no falls in the past year [3] : 2) Feeling down, depressed, or hopeless for nearly every day (3) [Patient declined mammogram] : Patient declined mammogram [Patient declined PAP Smear] : Patient declined PAP Smear [Patient declined bone density test] : Patient declined bone density test [HIV test declined] : HIV test declined [Patient declined colonoscopy] : Patient declined colonoscopy [With Significant Other] : lives with significant other [Hepatitis C test declined] : Hepatitis C test declined [] :  [# Of Children ___] : has [unfilled] children [Fully functional (using the telephone, shopping, preparing meals, housekeeping, doing laundry, using] : Fully functional and needs no help or supervision to perform IADLs (using the telephone, shopping, preparing meals, housekeeping, doing laundry, using transportation, managing medications and managing finances) [Fully functional (bathing, dressing, toileting, transferring, walking, feeding)] : Fully functional (bathing, dressing, toileting, transferring, walking, feeding) [Seat Belt] :  uses seat belt [Smoke Detector] : smoke detector [] : No [[id number]] : 6

## 2019-05-20 NOTE — COUNSELING
[Patient motivation] : Patient motivation [Needs reinforcement, provided] : Patient needs reinforcement on understanding lifestyle changes and  the steps needed to achieve self management goals and reinforcement was provided

## 2019-05-20 NOTE — ASSESSMENT
[FreeTextEntry1] : spent great time discussing options\par \par patient does not want any hcm - counseled on risks including MI/CVA/DEATH\par \par occasional dizziness - trial stopping terazosin\par refer back to cardiology\par refer to neurologist\par vertigo- neurologist\par \par Discussed diagnosis of anxiety and depression with the patient and potential outcomes/side affects of treatment versus non treatment. Medications were assessed and described at length. Side affects and black box warning were discussed.  Patient was advised to continue will all medications prescribed and the need for compliance was discussed and emphasized. Patient was advised to not stop medications without discussing with a health care provider first. Patient was advised to continue psychotherapy or seek therapy if not currently attending. Patient was educated on addictive potential of controlled substances and was counseled to use only as needed and sparingly. Patient verbalized understanding of all the above.\par off buproprion\par mood very up and down

## 2019-05-20 NOTE — HISTORY OF PRESENT ILLNESS
[de-identified] : 74 year old female  here for annual well visit. Patient's blood work was drawn and medications reviewed. Patient's past medical history was reviewed, allergies verified and problems were identified and assessed. Patients medications were reviewed. Patient is feeling well with no new or active complaints at this time.\par

## 2019-05-20 NOTE — REVIEW OF SYSTEMS
[Anxiety] : anxiety [Depression] : depression [Negative] : Heme/Lymph [de-identified] : occasional dizziness

## 2019-05-20 NOTE — PHYSICAL EXAM
[Well Nourished] : well nourished [Clear to Auscultation] : lungs were clear to auscultation bilaterally [Regular Rhythm] : with a regular rhythm [Normal S1, S2] : normal S1 and S2 [Normal Insight/Judgement] : insight and judgment were intact [No CVA Tenderness] : no CVA  tenderness [de-identified] : nystagmus with head movement, and changing sit to supine

## 2019-05-22 ENCOUNTER — TRANSCRIPTION ENCOUNTER (OUTPATIENT)
Age: 74
End: 2019-05-22

## 2019-05-22 ENCOUNTER — MEDICATION RENEWAL (OUTPATIENT)
Age: 74
End: 2019-05-22

## 2019-05-28 ENCOUNTER — RX RENEWAL (OUTPATIENT)
Age: 74
End: 2019-05-28

## 2019-05-28 ENCOUNTER — APPOINTMENT (OUTPATIENT)
Dept: CARDIOLOGY | Facility: CLINIC | Age: 74
End: 2019-05-28
Payer: MEDICARE

## 2019-05-28 VITALS
RESPIRATION RATE: 16 BRPM | OXYGEN SATURATION: 94 % | DIASTOLIC BLOOD PRESSURE: 75 MMHG | BODY MASS INDEX: 30.16 KG/M2 | HEIGHT: 68 IN | SYSTOLIC BLOOD PRESSURE: 178 MMHG | WEIGHT: 199 LBS | HEART RATE: 58 BPM

## 2019-05-28 PROCEDURE — 99214 OFFICE O/P EST MOD 30 MIN: CPT

## 2019-05-28 RX ORDER — SIMVASTATIN 80 MG/1
80 TABLET, FILM COATED ORAL
Qty: 90 | Refills: 1 | Status: DISCONTINUED | COMMUNITY
Start: 2017-12-20 | End: 2019-05-28

## 2019-05-28 RX ORDER — SIMVASTATIN 40 MG/1
40 TABLET, FILM COATED ORAL
Qty: 90 | Refills: 1 | Status: DISCONTINUED | COMMUNITY
Start: 2019-05-28 | End: 2019-05-28

## 2019-05-28 NOTE — DISCUSSION/SUMMARY
[FreeTextEntry1] : 74F with CAD, DM, HTN, HLD, carotid stenosis, who presents for follow up of CP, HTN, dizziness. \par \par 1. CAD:  Cont asa, plavix. Infarct without ischemia on recent stress. Nonobstructive carotid disease. Cont statin. Change zocor to lipitor 40mg given norvasc 10mg, elevated lipids despite zocor. \par \par 2. HTN: BP elevated. Pt very worked up in the office. Cont atenolol, amlodipine, irbesartan. Pt requesting to restart doxazosin, will switch from terazosin to doxazosin 4mg \par \par 3. Dizziness: Off alpha blocker.  To see neurology. Does not seem arrhythmogenic. \par \par RV 3 months

## 2019-05-28 NOTE — HISTORY OF PRESENT ILLNESS
[FreeTextEntry1] : 74F with HTN, DM, HLD, CAD s/p multiple stents ([facility] - last 2013) who presents for follow up of CP. Had been seen in Feb 2019 with chest pain, there was concern for ischemia, pt deferred ER visit and ended up with nuclear stress test which showed infarct but no ischemia.  Pt currently feels well without CP, SOB.  Pt still notes dizziness. She is confused about her medications. \par \par Former smoker, (quit Jan 2019). \par \par ECG: SR with RBBB, nonspecific ST-T wave changes\par TTE 2/2019: Normal LV function, severe concentric LVH, MAC, mild MS, mild AS, mild LAE\par NST: EF 53%, moderate fixed defect anterior wall, small mild defect basal inferior, basal inferoseptal walls\par Carotid 2/2019: Bilateral <50% ICA stenosis, R ECA >50% stenosis \par Lipids: 240/46/155/218\par \par Norvasc 10mg, atenolol 100mg, plavix 75mg, irbesartan 300mg, simvastatin 40mg, terazosin 10mg

## 2019-05-28 NOTE — PHYSICAL EXAM
[General Appearance - Well Developed] : well developed [Well Groomed] : well groomed [General Appearance - Well Nourished] : well nourished [No Deformities] : no deformities [General Appearance - In No Acute Distress] : no acute distress [Normal Conjunctiva] : the conjunctiva exhibited no abnormalities [Eyelids - No Xanthelasma] : the eyelids demonstrated no xanthelasmas [Normal Oral Mucosa] : normal oral mucosa [No Oral Pallor] : no oral pallor [No Oral Cyanosis] : no oral cyanosis [Normal Jugular Venous A Waves Present] : normal jugular venous A waves present [Normal Jugular Venous V Waves Present] : normal jugular venous V waves present [No Jugular Venous Mahan A Waves] : no jugular venous mahan A waves [Respiration, Rhythm And Depth] : normal respiratory rhythm and effort [Exaggerated Use Of Accessory Muscles For Inspiration] : no accessory muscle use [Auscultation Breath Sounds / Voice Sounds] : lungs were clear to auscultation bilaterally [Chest Palpation] : palpation of the chest revealed no abnormalities [Heart Rate And Rhythm] : heart rate and rhythm were normal [Heart Sounds] : normal S1 and S2 [Edema] : no peripheral edema present [FreeTextEntry1] : 2/6 systolic murmur at upper sternal border [Abdomen Soft] : soft [Abdomen Tenderness] : non-tender [Abdomen Mass (___ Cm)] : no abdominal mass palpated [Abnormal Walk] : normal gait [Gait - Sufficient For Exercise Testing] : the gait was sufficient for exercise testing [Nail Clubbing] : no clubbing of the fingernails [Cyanosis, Localized] : no localized cyanosis [Petechial Hemorrhages (___cm)] : no petechial hemorrhages [] : no rash [Skin Color & Pigmentation] : normal skin color and pigmentation [No Venous Stasis] : no venous stasis [Skin Lesions] : no skin lesions [No Skin Ulcers] : no skin ulcer [No Xanthoma] : no  xanthoma was observed [Oriented To Time, Place, And Person] : oriented to person, place, and time [Affect] : the affect was normal [Mood] : the mood was normal [No Anxiety] : not feeling anxious

## 2019-05-28 NOTE — REVIEW OF SYSTEMS
[Feeling Fatigued] : not feeling fatigued [Shortness Of Breath] : no shortness of breath [Chest  Pressure] : no chest pressure [Cough] : no cough [Chest Pain] : no chest pain [Dizziness] : dizziness [Negative] : Heme/Lymph

## 2019-05-29 ENCOUNTER — TRANSCRIPTION ENCOUNTER (OUTPATIENT)
Age: 74
End: 2019-05-29

## 2019-06-04 ENCOUNTER — RX RENEWAL (OUTPATIENT)
Age: 74
End: 2019-06-04

## 2019-06-24 ENCOUNTER — CLINICAL ADVICE (OUTPATIENT)
Age: 74
End: 2019-06-24

## 2019-07-02 ENCOUNTER — APPOINTMENT (OUTPATIENT)
Dept: CARDIOLOGY | Facility: CLINIC | Age: 74
End: 2019-07-02

## 2019-07-02 ENCOUNTER — APPOINTMENT (OUTPATIENT)
Dept: ENDOCRINOLOGY | Facility: CLINIC | Age: 74
End: 2019-07-02
Payer: MEDICARE

## 2019-07-02 ENCOUNTER — MEDICATION RENEWAL (OUTPATIENT)
Age: 74
End: 2019-07-02

## 2019-07-02 VITALS
SYSTOLIC BLOOD PRESSURE: 125 MMHG | HEART RATE: 62 BPM | OXYGEN SATURATION: 97 % | HEIGHT: 68 IN | WEIGHT: 199 LBS | BODY MASS INDEX: 30.16 KG/M2 | RESPIRATION RATE: 16 BRPM | DIASTOLIC BLOOD PRESSURE: 70 MMHG

## 2019-07-02 DIAGNOSIS — R94.6 ABNORMAL RESULTS OF THYROID FUNCTION STUDIES: ICD-10-CM

## 2019-07-02 PROCEDURE — 99204 OFFICE O/P NEW MOD 45 MIN: CPT

## 2019-07-02 NOTE — HISTORY OF PRESENT ILLNESS
[FreeTextEntry1] : 74 year female  referred for hypothyroidism management. \par Ms. DOMINGUEZ  was diagnosed with hypothyroidism in 2016\par Most recently on 112 mcg of generic LT4. She reports daily intake.\par Denies family history of thyroid cancer or history of radiation exposure to head and neck area in a childhood.\par She has been also treated for multiple medical problems including CAD (s/p multiple stents, HTN, HLD, diet controlled diabetes)\par TSH-8.01, FT4- 0.9, a1c- 6.1

## 2019-07-02 NOTE — ASSESSMENT
[Carbohydrate Consistent Diet] : carbohydrate consistent diet [FreeTextEntry1] : Proper intake of levothyroxine is reviewed in details, including on an empty stomach, with water only, at least one hour before taking any medications, vitamins, or supplements and three-four hours before taking iron or calcium.\par slow uptitration given the age and history of CAD\par - increase synthroid 125mcg qd\par - thyroid US\par - in regards of her diabetes, it appears well controlled on the diet only. Current approaches to diabetes management are discussed with the patient. Target ranges for blood sugar, blood pressure and cholesterol reviewed, and risk reduction strategies verified. Suggested extensive diabetes education program, including nutritional and diabetes teaching and evaluation. Proper dietary restrictions and exercise routines discussed. \par Glucometer/SMBG and log book charting discussed.\par - advised neurology evaluation\par RTC 2 months\par

## 2019-07-09 ENCOUNTER — MEDICATION RENEWAL (OUTPATIENT)
Age: 74
End: 2019-07-09

## 2019-08-19 ENCOUNTER — APPOINTMENT (OUTPATIENT)
Dept: FAMILY MEDICINE | Facility: CLINIC | Age: 74
End: 2019-08-19
Payer: MEDICARE

## 2019-08-19 ENCOUNTER — APPOINTMENT (OUTPATIENT)
Dept: CARDIOLOGY | Facility: CLINIC | Age: 74
End: 2019-08-19
Payer: MEDICARE

## 2019-08-19 VITALS — DIASTOLIC BLOOD PRESSURE: 65 MMHG | SYSTOLIC BLOOD PRESSURE: 180 MMHG

## 2019-08-19 VITALS
SYSTOLIC BLOOD PRESSURE: 135 MMHG | BODY MASS INDEX: 30.16 KG/M2 | HEIGHT: 68 IN | WEIGHT: 199 LBS | OXYGEN SATURATION: 96 % | DIASTOLIC BLOOD PRESSURE: 70 MMHG | HEART RATE: 68 BPM

## 2019-08-19 PROCEDURE — 99214 OFFICE O/P EST MOD 30 MIN: CPT

## 2019-08-19 NOTE — HISTORY OF PRESENT ILLNESS
[de-identified] : 74 year old female is here for a followup visit. Patient is here for medication renewals and for blood work discussion. Medications and allergies were reviewed and assessed.  There has been no new medications since the last visit. Patient is feeling well with no active changes or issues since Her last visit.\par \par just saw cardio today\par

## 2019-08-19 NOTE — PHYSICAL EXAM
[Well Nourished] : well nourished [Clear to Auscultation] : lungs were clear to auscultation bilaterally [Regular Rhythm] : with a regular rhythm [Normal S1, S2] : normal S1 and S2 [No CVA Tenderness] : no CVA  tenderness [Normal Insight/Judgement] : insight and judgment were intact [de-identified] : nystagmus with head movement, and changing sit to supine

## 2019-08-19 NOTE — PHYSICAL EXAM
[General Appearance - Well Developed] : well developed [Well Groomed] : well groomed [No Deformities] : no deformities [General Appearance - Well Nourished] : well nourished [General Appearance - In No Acute Distress] : no acute distress [Normal Conjunctiva] : the conjunctiva exhibited no abnormalities [Eyelids - No Xanthelasma] : the eyelids demonstrated no xanthelasmas [Normal Oral Mucosa] : normal oral mucosa [No Oral Pallor] : no oral pallor [No Oral Cyanosis] : no oral cyanosis [Normal Jugular Venous A Waves Present] : normal jugular venous A waves present [Normal Jugular Venous V Waves Present] : normal jugular venous V waves present [No Jugular Venous Mahan A Waves] : no jugular venous mahan A waves [Respiration, Rhythm And Depth] : normal respiratory rhythm and effort [Exaggerated Use Of Accessory Muscles For Inspiration] : no accessory muscle use [Auscultation Breath Sounds / Voice Sounds] : lungs were clear to auscultation bilaterally [Chest Palpation] : palpation of the chest revealed no abnormalities [Heart Rate And Rhythm] : heart rate and rhythm were normal [Heart Sounds] : normal S1 and S2 [Edema] : no peripheral edema present [Abdomen Tenderness] : non-tender [Abdomen Soft] : soft [Abdomen Mass (___ Cm)] : no abdominal mass palpated [Abnormal Walk] : normal gait [Gait - Sufficient For Exercise Testing] : the gait was sufficient for exercise testing [Cyanosis, Localized] : no localized cyanosis [Nail Clubbing] : no clubbing of the fingernails [Petechial Hemorrhages (___cm)] : no petechial hemorrhages [Skin Color & Pigmentation] : normal skin color and pigmentation [No Venous Stasis] : no venous stasis [] : no rash [Skin Lesions] : no skin lesions [No Skin Ulcers] : no skin ulcer [No Xanthoma] : no  xanthoma was observed [Affect] : the affect was normal [Oriented To Time, Place, And Person] : oriented to person, place, and time [Mood] : the mood was normal [No Anxiety] : not feeling anxious [FreeTextEntry1] : 2/6 systolic murmur at upper sternal border

## 2019-08-19 NOTE — DISCUSSION/SUMMARY
[FreeTextEntry1] : 74F with CAD, DM, HTN, HLD, carotid stenosis, who presents for follow up of HTN, dizziness. \par \par 1. CAD:  Cont asa, plavix. Infarct without ischemia on recent stress. Nonobstructive carotid disease. Cont statin. Recheck lipids\par \par 2. HTN: BP remains elevated. Pt very worked up in the office. Cont atenolol, amlodipine, irbesartan. Very labile BP's.  Do not want to start making too many medication changes at this time, however her symptoms may be attributable to her elevated BP. Will consider adding mediations if testing is otherwise negative. \par \par 3. Dizziness: On terazosin.  To see neurology. Does not seem arrhythmogenic. Check 24 hour Holter. I am not sure if her symptoms are attributable to an organic cause.  \par \par Check routine labs, 24 hour Holter\par RV 3 months

## 2019-08-19 NOTE — HISTORY OF PRESENT ILLNESS
[FreeTextEntry1] : 74F with HTN, DM, HLD, CAD s/p multiple stents ([facility] - last 2013) who presents for follow up of lightheadedness, dizziness.  She has been in bed a lot of time, because she feels very unsteady on her feet.  Had an episode where she had a near syncopal episode in a supermarket last week. Chronic SOB, no CP.  She notes intermittent diaphoresis, occasional episodes of heavy breathing. \par \par Had been seen in Feb 2019 with chest pain, there was concern for ischemia, pt deferred ER visit and ended up with nuclear stress test which showed infarct but no ischemia. She is confused about her medications. \par \par Former smoker, (quit Jan 2019). \par \par ECG: SR with RBBB, nonspecific ST-T wave changes\par TTE 2/2019: Normal LV function, severe concentric LVH, MAC, mild MS, mild AS, mild LAE\par NST: EF 53%, moderate fixed defect anterior wall, small mild defect basal inferior, basal inferoseptal walls\par Carotid 2/2019: Bilateral <50% ICA stenosis, R ECA >50% stenosis \par Lipids: 240/46/155/218\par \par Norvasc 10mg, atenolol 100mg, plavix 75mg, irbesartan 300mg, lipitor 40mg, terazosin 10mg

## 2019-08-19 NOTE — HEALTH RISK ASSESSMENT
[] : No [No falls in past year] : Patient reported no falls in the past year [3] : 2) Feeling down, depressed, or hopeless for nearly every day (3) [[id number]] : 6 [Fair] :  ~his/her~ mood as fair [Patient declined mammogram] : Patient declined mammogram [Patient declined PAP Smear] : Patient declined PAP Smear [Patient declined bone density test] : Patient declined bone density test [Patient declined colonoscopy] : Patient declined colonoscopy [HIV test declined] : HIV test declined [Hepatitis C test declined] : Hepatitis C test declined [With Significant Other] : lives with significant other [] :  [# Of Children ___] : has [unfilled] children [Fully functional (bathing, dressing, toileting, transferring, walking, feeding)] : Fully functional (bathing, dressing, toileting, transferring, walking, feeding) [Fully functional (using the telephone, shopping, preparing meals, housekeeping, doing laundry, using] : Fully functional and needs no help or supervision to perform IADLs (using the telephone, shopping, preparing meals, housekeeping, doing laundry, using transportation, managing medications and managing finances) [Smoke Detector] : smoke detector [Seat Belt] :  uses seat belt

## 2019-08-19 NOTE — REVIEW OF SYSTEMS
[Feeling Fatigued] : feeling fatigued [Shortness Of Breath] : shortness of breath [Dizziness] : dizziness [Negative] : Heme/Lymph [Chest  Pressure] : no chest pressure [Chest Pain] : no chest pain [Cough] : no cough

## 2019-08-19 NOTE — ASSESSMENT
[FreeTextEntry1] : spent great time discussing options\par \par patient does not want any hcm - counseled on risks including MI/CVA/DEATH\par \par occasional dizziness - trial stopping terazosin, off alpha blocker\par refer back to cardiology - going for holter monitor tomorrow\par \par \par saw neurologist -  was cleared\par \par mood\par Discussed diagnosis of anxiety and depression with the patient and potential outcomes/side affects of treatment versus non treatment. Medications were assessed and described at length. Side affects and black box warning were discussed.  Patient was advised to continue will all medications prescribed and the need for compliance was discussed and emphasized. Patient was advised to not stop medications without discussing with a health care provider first. Patient was advised to continue psychotherapy or seek therapy if not currently attending. Patient was educated on addictive potential of controlled substances and was counseled to use only as needed and sparingly. Patient verbalized understanding of all the above.\par off buproprion\par mood very up and down - described as quirky\par \par diabetes\par The diagnosis of diabetes is established with this patient.  Blood work was drawn in office and results will be reviewed and followed. The patient was counseled on using a low sugar and carbohydrate diet.  Patient was advised to eat small meals and exercise regularly. Patient as advised to take all medications as prescribed and to follow up with yearly podiatry and opthalmology visits. Patient was advised to call office  or go to the ER immediately if experiences any nausea, lightheadedness or for any other issues.\par blood drawn today\par will fu with endo

## 2019-08-19 NOTE — REVIEW OF SYSTEMS
[Anxiety] : anxiety [Depression] : depression [Negative] : Heme/Lymph [de-identified] : occasional dizziness

## 2019-08-20 LAB
ALBUMIN SERPL ELPH-MCNC: 4 G/DL
ALP BLD-CCNC: 105 U/L
ALT SERPL-CCNC: 15 U/L
ANION GAP SERPL CALC-SCNC: 13 MMOL/L
AST SERPL-CCNC: 21 U/L
BASOPHILS # BLD AUTO: 0.05 K/UL
BASOPHILS NFR BLD AUTO: 0.6 %
BILIRUB SERPL-MCNC: <0.2 MG/DL
BUN SERPL-MCNC: 28 MG/DL
CALCIUM SERPL-MCNC: 9.4 MG/DL
CHLORIDE SERPL-SCNC: 107 MMOL/L
CHOLEST SERPL-MCNC: 216 MG/DL
CHOLEST/HDLC SERPL: 5.5 RATIO
CO2 SERPL-SCNC: 23 MMOL/L
CREAT SERPL-MCNC: 1.3 MG/DL
EOSINOPHIL # BLD AUTO: 0.08 K/UL
EOSINOPHIL NFR BLD AUTO: 0.9 %
ESTIMATED AVERAGE GLUCOSE: 137 MG/DL
GLUCOSE SERPL-MCNC: 157 MG/DL
HBA1C MFR BLD HPLC: 6.4 %
HCT VFR BLD CALC: 32.4 %
HDLC SERPL-MCNC: 39 MG/DL
HGB BLD-MCNC: 9.9 G/DL
IMM GRANULOCYTES NFR BLD AUTO: 0.4 %
LDLC SERPL CALC-MCNC: 135 MG/DL
LYMPHOCYTES # BLD AUTO: 1.56 K/UL
LYMPHOCYTES NFR BLD AUTO: 18.5 %
MAN DIFF?: NORMAL
MCHC RBC-ENTMCNC: 27.2 PG
MCHC RBC-ENTMCNC: 30.6 GM/DL
MCV RBC AUTO: 89 FL
MONOCYTES # BLD AUTO: 0.57 K/UL
MONOCYTES NFR BLD AUTO: 6.7 %
NEUTROPHILS # BLD AUTO: 6.16 K/UL
NEUTROPHILS NFR BLD AUTO: 72.9 %
PLATELET # BLD AUTO: 170 K/UL
POTASSIUM SERPL-SCNC: 3.8 MMOL/L
PROT SERPL-MCNC: 6.7 G/DL
RBC # BLD: 3.64 M/UL
RBC # FLD: 15 %
SODIUM SERPL-SCNC: 143 MMOL/L
TRIGL SERPL-MCNC: 210 MG/DL
TSH SERPL-ACNC: 6.04 UIU/ML
WBC # FLD AUTO: 8.45 K/UL

## 2019-11-19 ENCOUNTER — APPOINTMENT (OUTPATIENT)
Dept: FAMILY MEDICINE | Facility: CLINIC | Age: 74
End: 2019-11-19
Payer: MEDICARE

## 2019-11-19 ENCOUNTER — LABORATORY RESULT (OUTPATIENT)
Age: 74
End: 2019-11-19

## 2019-11-19 VITALS
BODY MASS INDEX: 45.99 KG/M2 | HEIGHT: 67 IN | RESPIRATION RATE: 16 BRPM | DIASTOLIC BLOOD PRESSURE: 62 MMHG | SYSTOLIC BLOOD PRESSURE: 144 MMHG | OXYGEN SATURATION: 95 % | HEART RATE: 63 BPM | WEIGHT: 293 LBS

## 2019-11-19 DIAGNOSIS — Z23 ENCOUNTER FOR IMMUNIZATION: ICD-10-CM

## 2019-11-19 LAB
ALBUMIN SERPL ELPH-MCNC: 3.9 G/DL
ALP BLD-CCNC: 97 U/L
ALT SERPL-CCNC: 10 U/L
ANION GAP SERPL CALC-SCNC: 10 MMOL/L
AST SERPL-CCNC: 16 U/L
BASOPHILS # BLD AUTO: 0.04 K/UL
BASOPHILS NFR BLD AUTO: 0.5 %
BILIRUB SERPL-MCNC: 0.2 MG/DL
BUN SERPL-MCNC: 24 MG/DL
CALCIUM SERPL-MCNC: 9.4 MG/DL
CHLORIDE SERPL-SCNC: 111 MMOL/L
CHOLEST SERPL-MCNC: 191 MG/DL
CHOLEST/HDLC SERPL: 4.9 RATIO
CO2 SERPL-SCNC: 23 MMOL/L
CREAT SERPL-MCNC: 1.21 MG/DL
EOSINOPHIL # BLD AUTO: 0.07 K/UL
EOSINOPHIL NFR BLD AUTO: 0.9 %
ESTIMATED AVERAGE GLUCOSE: 126 MG/DL
FERRITIN SERPL-MCNC: 20 NG/ML
FOLATE SERPL-MCNC: 6.4 NG/ML
GLUCOSE SERPL-MCNC: 143 MG/DL
HBA1C MFR BLD HPLC: 6 %
HCT VFR BLD CALC: 31.8 %
HDLC SERPL-MCNC: 39 MG/DL
HGB BLD-MCNC: 9.4 G/DL
IMM GRANULOCYTES NFR BLD AUTO: 0.3 %
IRON SATN MFR SERPL: 8 %
IRON SERPL-MCNC: 27 UG/DL
LDLC SERPL CALC-MCNC: 116 MG/DL
LYMPHOCYTES # BLD AUTO: 1.54 K/UL
LYMPHOCYTES NFR BLD AUTO: 20 %
MAN DIFF?: NORMAL
MCHC RBC-ENTMCNC: 26.6 PG
MCHC RBC-ENTMCNC: 29.6 GM/DL
MCV RBC AUTO: 90.1 FL
MONOCYTES # BLD AUTO: 0.62 K/UL
MONOCYTES NFR BLD AUTO: 8.1 %
NEUTROPHILS # BLD AUTO: 5.4 K/UL
NEUTROPHILS NFR BLD AUTO: 70.2 %
PLATELET # BLD AUTO: 201 K/UL
POTASSIUM SERPL-SCNC: 5 MMOL/L
PROT SERPL-MCNC: 6.6 G/DL
RBC # BLD: 3.53 M/UL
RBC # FLD: 17.8 %
SODIUM SERPL-SCNC: 144 MMOL/L
TIBC SERPL-MCNC: 319 UG/DL
TRIGL SERPL-MCNC: 178 MG/DL
TSH SERPL-ACNC: 4.85 UIU/ML
UIBC SERPL-MCNC: 292 UG/DL
VIT B12 SERPL-MCNC: 468 PG/ML
WBC # FLD AUTO: 7.69 K/UL

## 2019-11-19 PROCEDURE — 36415 COLL VENOUS BLD VENIPUNCTURE: CPT

## 2019-11-19 PROCEDURE — 90472 IMMUNIZATION ADMIN EACH ADD: CPT

## 2019-11-19 PROCEDURE — 90674 CCIIV4 VAC NO PRSV 0.5 ML IM: CPT

## 2019-11-19 PROCEDURE — 99214 OFFICE O/P EST MOD 30 MIN: CPT | Mod: 25

## 2019-11-19 PROCEDURE — 90732 PPSV23 VACC 2 YRS+ SUBQ/IM: CPT

## 2019-11-19 PROCEDURE — G0009: CPT

## 2019-11-19 RX ORDER — MECLIZINE HYDROCHLORIDE 25 MG/1
25 TABLET ORAL 3 TIMES DAILY
Qty: 30 | Refills: 0 | Status: DISCONTINUED | COMMUNITY
Start: 2019-05-20 | End: 2019-11-19

## 2019-11-19 NOTE — REVIEW OF SYSTEMS
[Anxiety] : anxiety [Depression] : depression [Negative] : Heme/Lymph [de-identified] : occasional dizziness

## 2019-11-19 NOTE — HISTORY OF PRESENT ILLNESS
[de-identified] : 74 year old female is here for a followup visit. Patient is here for medication renewals and for blood work discussion. Medications and allergies were reviewed and assessed.  There has been no new medications since the last visit. Patient is feeling well with no active changes or issues since Her last visit.\par \par \par

## 2019-11-19 NOTE — PHYSICAL EXAM
[Well Nourished] : well nourished [Clear to Auscultation] : lungs were clear to auscultation bilaterally [Normal S1, S2] : normal S1 and S2 [Regular Rhythm] : with a regular rhythm [No CVA Tenderness] : no CVA  tenderness [Normal Insight/Judgement] : insight and judgment were intact [de-identified] : nystagmus with head movement, and changing sit to supine

## 2019-11-19 NOTE — HEALTH RISK ASSESSMENT
[] : No [No] : No [No falls in past year] : Patient reported no falls in the past year [3] : 2) Feeling down, depressed, or hopeless for nearly every day (3) [[id number]] : 6 [Fair] : ~his/her~ current health as fair  [Patient declined mammogram] : Patient declined mammogram [Patient declined PAP Smear] : Patient declined PAP Smear [Patient declined bone density test] : Patient declined bone density test [Patient declined colonoscopy] : Patient declined colonoscopy [Hepatitis C test declined] : Hepatitis C test declined [HIV test declined] : HIV test declined [With Significant Other] : lives with significant other [] :  [# Of Children ___] : has [unfilled] children [Fully functional (bathing, dressing, toileting, transferring, walking, feeding)] : Fully functional (bathing, dressing, toileting, transferring, walking, feeding) [Fully functional (using the telephone, shopping, preparing meals, housekeeping, doing laundry, using] : Fully functional and needs no help or supervision to perform IADLs (using the telephone, shopping, preparing meals, housekeeping, doing laundry, using transportation, managing medications and managing finances) [Smoke Detector] : smoke detector [Seat Belt] :  uses seat belt

## 2019-11-19 NOTE — ASSESSMENT
[FreeTextEntry1] : spent great time discussing options\par \par occasional dizziness/near syncope \par refer back to cardiology - going today\par had colonoscopy\par saw neurologist -  was cleared\par \par mood\par Discussed diagnosis of anxiety and depression with the patient and potential outcomes/side affects of treatment versus non treatment. Medications were assessed and described at length. Side affects and black box warning were discussed.  Patient was advised to continue will all medications prescribed and the need for compliance was discussed and emphasized. Patient was advised to not stop medications without discussing with a health care provider first. Patient was advised to continue psychotherapy or seek therapy if not currently attending. Patient was educated on addictive potential of controlled substances and was counseled to use only as needed and sparingly. Patient verbalized understanding of all the above.\par off buproprion\par mood very up and down - described as quirky\par \par diabetes\par The diagnosis of diabetes is established with this patient.  Blood work was drawn in office and results will be reviewed and followed. The patient was counseled on using a low sugar and carbohydrate diet.  Patient was advised to eat small meals and exercise regularly. Patient as advised to take all medications as prescribed and to follow up with yearly podiatry and opthalmology visits. Patient was advised to call office  or go to the ER immediately if experiences any nausea, lightheadedness or for any other issues.\par meds were stopped due to low sugars, and due to her passing out\par will recheck today\par \par thyroid\par Patient had a diagnosis of thyroid disorder. Blood was drawn to assess levels of TSH and T4. Patient will continue on current dose of medications at this time unless instructed otherwise. Patient was advised to take thyroid medications on a empty stomach and to wait at least 45 minutes before eating for appropriate absorption.\par patient has not been taking meds - compliance enforced\par

## 2019-11-22 ENCOUNTER — APPOINTMENT (OUTPATIENT)
Dept: CARDIOLOGY | Facility: CLINIC | Age: 74
End: 2019-11-22
Payer: MEDICARE

## 2019-11-22 VITALS
OXYGEN SATURATION: 96 % | WEIGHT: 195 LBS | HEIGHT: 67 IN | SYSTOLIC BLOOD PRESSURE: 139 MMHG | DIASTOLIC BLOOD PRESSURE: 54 MMHG | BODY MASS INDEX: 30.61 KG/M2 | HEART RATE: 58 BPM

## 2019-11-22 PROCEDURE — 99214 OFFICE O/P EST MOD 30 MIN: CPT

## 2019-11-22 NOTE — PHYSICAL EXAM
[General Appearance - Well Developed] : well developed [Well Groomed] : well groomed [General Appearance - Well Nourished] : well nourished [No Deformities] : no deformities [General Appearance - In No Acute Distress] : no acute distress [Normal Conjunctiva] : the conjunctiva exhibited no abnormalities [Eyelids - No Xanthelasma] : the eyelids demonstrated no xanthelasmas [Normal Oral Mucosa] : normal oral mucosa [No Oral Pallor] : no oral pallor [No Oral Cyanosis] : no oral cyanosis [Normal Jugular Venous A Waves Present] : normal jugular venous A waves present [Normal Jugular Venous V Waves Present] : normal jugular venous V waves present [No Jugular Venous Mahan A Waves] : no jugular venous mahan A waves [Respiration, Rhythm And Depth] : normal respiratory rhythm and effort [Exaggerated Use Of Accessory Muscles For Inspiration] : no accessory muscle use [Auscultation Breath Sounds / Voice Sounds] : lungs were clear to auscultation bilaterally [Chest Palpation] : palpation of the chest revealed no abnormalities [Heart Rate And Rhythm] : heart rate and rhythm were normal [Heart Sounds] : normal S1 and S2 [Edema] : no peripheral edema present [FreeTextEntry1] : 2/6 systolic murmur at upper sternal border [Abdomen Soft] : soft [Abdomen Tenderness] : non-tender [Abdomen Mass (___ Cm)] : no abdominal mass palpated [Abnormal Walk] : normal gait [Gait - Sufficient For Exercise Testing] : the gait was sufficient for exercise testing [Nail Clubbing] : no clubbing of the fingernails [Cyanosis, Localized] : no localized cyanosis [Petechial Hemorrhages (___cm)] : no petechial hemorrhages [Skin Color & Pigmentation] : normal skin color and pigmentation [] : no rash [No Venous Stasis] : no venous stasis [Skin Lesions] : no skin lesions [No Skin Ulcers] : no skin ulcer [No Xanthoma] : no  xanthoma was observed [Oriented To Time, Place, And Person] : oriented to person, place, and time [Affect] : the affect was normal [Mood] : the mood was normal [No Anxiety] : not feeling anxious

## 2019-11-22 NOTE — HISTORY OF PRESENT ILLNESS
[FreeTextEntry1] : 74F with HTN, DM (6.0), HLD, CAD s/p multiple stents ([facility] - last 2013) who presents for follow up, syncope, chest pain.\par \par Pt had syncopal episode at [facility] last week. [place] it coming, felt lightheaded and next thing she knew she was on the floor.  She refused an ambulance.  \par Has had multiple syncopal and near syncopal episodes over the last few months. She notes she has had these symptoms for a long time. She does not eat or drink appropriately. Symptoms for at least a few years. \par Feels very fatigued, not sleeping well. \par No further chest pain. \par Chronic, ongoing shortness of breath. \par She is intermittently taking her medications\par 48 hour Holter unremarkable other than a 9 beat run of AT\par \par Former smoker, (quit Jan 2019). \par \par ECG: SR with RBBB, nonspecific ST-T wave changes\par TTE 2/2019: Normal LV function, severe concentric LVH, MAC, mild MS, mild AS, mild LAE\par NST: EF 53%, moderate fixed defect anterior wall, small mild defect basal inferior, basal inferoseptal walls\par Carotid 2/2019: Bilateral <50% ICA stenosis, R ECA >50% stenosis \par Lipids: 240/46/155/218\par Holter: SR, rare PAC's, 9 beat run AT, rare PVC's\par \par Norvasc 10mg, atenolol 100mg, plavix 75mg, irbesartan 300mg, lipitor 40mg, terazosin 10mg

## 2019-11-22 NOTE — DISCUSSION/SUMMARY
[FreeTextEntry1] : 74F with CAD, DM, HTN, HLD, carotid stenosis, who presents for follow up of HTN, dizziness. \par \par 1. Syncope:  Longstanding, multiple episodes. The etiology is not clear and there are many possible causes. She does not eat or drink enough. She had DM, is off all agents, but her a1c is 6.0%, perhaps she is having episodes of hypoglycemia. They may be related to her underlying anemia or labile BP. Arrhythmogenic? 48 hour Holter showing rare PAC's, PVC's. Each issue is being addressed. \par \par -Continue to encourage po intake, hydration\par -Consider scaling back on BP meds however she has a tendency to run very high BP. She is very hesitant to change her meds. Would favor lowering the dose of the beta blocker (she is hesitant due to her palpitations) or terazosin.\par -Off all agents for DM\par -Supposed to be on iron supplementation, following with heme for anemia\par -Following with neurology\par -She is hesitant to have a loop recorder placed, will order 30 day event monitor\par -Med compliance strongly stressed, appropriate sleep hygiene, eating\par \par 2. CAD:  Cont plavix monotherapy. Infarct without ischemia on recent stress. Nonobstructive carotid disease. Cont statin. Recheck lipids\par \par 3. HTN: BP is excellent today. Cont atenolol, amlodipine, irbesartan. Very labile BP's.  Do not want to start making too many medication changes at this time, however can scale back if necessary. \par \par RV 2 months\par

## 2019-11-22 NOTE — REVIEW OF SYSTEMS
[Feeling Fatigued] : feeling fatigued [Shortness Of Breath] : shortness of breath [Chest  Pressure] : no chest pressure [Chest Pain] : no chest pain [Cough] : no cough [Dizziness] : dizziness [Negative] : Heme/Lymph

## 2019-12-27 ENCOUNTER — MEDICATION RENEWAL (OUTPATIENT)
Age: 74
End: 2019-12-27

## 2019-12-29 ENCOUNTER — MEDICATION RENEWAL (OUTPATIENT)
Age: 74
End: 2019-12-29

## 2019-12-30 ENCOUNTER — RESULT CHARGE (OUTPATIENT)
Age: 74
End: 2019-12-30

## 2019-12-30 PROCEDURE — 93228 REMOTE 30 DAY ECG REV/REPORT: CPT

## 2020-02-07 ENCOUNTER — RX RENEWAL (OUTPATIENT)
Age: 75
End: 2020-02-07

## 2020-02-12 ENCOUNTER — RX RENEWAL (OUTPATIENT)
Age: 75
End: 2020-02-12

## 2020-02-18 ENCOUNTER — APPOINTMENT (OUTPATIENT)
Dept: FAMILY MEDICINE | Facility: CLINIC | Age: 75
End: 2020-02-18
Payer: MEDICARE

## 2020-02-18 VITALS — OXYGEN SATURATION: 95 % | HEART RATE: 60 BPM | SYSTOLIC BLOOD PRESSURE: 160 MMHG | DIASTOLIC BLOOD PRESSURE: 60 MMHG

## 2020-02-18 PROCEDURE — 36415 COLL VENOUS BLD VENIPUNCTURE: CPT

## 2020-02-18 PROCEDURE — 99214 OFFICE O/P EST MOD 30 MIN: CPT | Mod: 25

## 2020-02-18 NOTE — PHYSICAL EXAM
[Well Nourished] : well nourished [Clear to Auscultation] : lungs were clear to auscultation bilaterally [Regular Rhythm] : with a regular rhythm [Normal S1, S2] : normal S1 and S2 [No CVA Tenderness] : no CVA  tenderness [Normal Insight/Judgement] : insight and judgment were intact

## 2020-02-18 NOTE — ASSESSMENT
[FreeTextEntry1] : near syncope\par only one occurrence in 12/2019, needs to see cardio\par \par anemia\par patient with low iron, was advised to take iron, however patient refused\par \par mood\par Discussed diagnosis of anxiety and depression with the patient and potential outcomes/side affects of treatment versus non treatment. Medications were assessed and described at length. Side affects and black box warning were discussed.  Patient was advised to continue will all medications prescribed and the need for compliance was discussed and emphasized. Patient was advised to not stop medications without discussing with a health care provider first. Patient was advised to continue psychotherapy or seek therapy if not currently attending. Patient was educated on addictive potential of controlled substances and was counseled to use only as needed and sparingly. Patient verbalized understanding of all the above.\par off buproprion\par mood very up and down - described as quirky\par uses valium as needed for anxiety\par \par insomnia\par does well with ambien, allows her sleep, only uses as needed, not every night\par \par diabetes\par The diagnosis of diabetes is established with this patient.  Blood work was drawn in office and results will be reviewed and followed. The patient was counseled on using a low sugar and carbohydrate diet.  Patient was advised to eat small meals and exercise regularly. Patient as advised to take all medications as prescribed and to follow up with yearly podiatry and opthalmology visits. Patient was advised to call office  or go to the ER immediately if experiences any nausea, lightheadedness or for any other issues.\par meds were stopped due to low sugars, and due to her passing out\par last a1c was 6.0\par \par thyroid\par Patient had a diagnosis of thyroid disorder. Blood was drawn to assess levels of TSH and T4. Patient will continue on current dose of medications at this time unless instructed otherwise. Patient was advised to take thyroid medications on a empty stomach and to wait at least 45 minutes before eating for appropriate absorption.\par patient has not been taking meds - compliance enforced\par

## 2020-02-18 NOTE — REVIEW OF SYSTEMS
[Anxiety] : anxiety [Depression] : depression [Negative] : Heme/Lymph [de-identified] : occasional dizziness

## 2020-02-18 NOTE — HEALTH RISK ASSESSMENT
[] : No [No] : No [No falls in past year] : Patient reported no falls in the past year [3] : 2) Feeling down, depressed, or hopeless for nearly every day (3) [[id number]] : 6 [Fair] :  ~his/her~ mood as fair [Patient declined mammogram] : Patient declined mammogram [Patient declined PAP Smear] : Patient declined PAP Smear [Patient declined bone density test] : Patient declined bone density test [Patient declined colonoscopy] : Patient declined colonoscopy [HIV test declined] : HIV test declined [Hepatitis C test declined] : Hepatitis C test declined [With Significant Other] : lives with significant other [] :  [# Of Children ___] : has [unfilled] children [Fully functional (bathing, dressing, toileting, transferring, walking, feeding)] : Fully functional (bathing, dressing, toileting, transferring, walking, feeding) [Fully functional (using the telephone, shopping, preparing meals, housekeeping, doing laundry, using] : Fully functional and needs no help or supervision to perform IADLs (using the telephone, shopping, preparing meals, housekeeping, doing laundry, using transportation, managing medications and managing finances) [Smoke Detector] : smoke detector [Seat Belt] :  uses seat belt

## 2020-02-18 NOTE — HISTORY OF PRESENT ILLNESS
[de-identified] : 74 year old female is here for a followup visit. Patient is here for medication renewals and for blood work discussion. Medications and allergies were reviewed and assessed.  There has been no new medications since the last visit. Patient is feeling well with no active changes or issues since Her last visit.\par \par \par

## 2020-02-24 ENCOUNTER — APPOINTMENT (OUTPATIENT)
Dept: CARDIOLOGY | Facility: CLINIC | Age: 75
End: 2020-02-24
Payer: MEDICARE

## 2020-02-24 VITALS
OXYGEN SATURATION: 94 % | SYSTOLIC BLOOD PRESSURE: 150 MMHG | HEART RATE: 68 BPM | HEIGHT: 67 IN | DIASTOLIC BLOOD PRESSURE: 55 MMHG | WEIGHT: 195 LBS | BODY MASS INDEX: 30.61 KG/M2

## 2020-02-24 PROCEDURE — 99214 OFFICE O/P EST MOD 30 MIN: CPT

## 2020-02-24 NOTE — PHYSICAL EXAM
[General Appearance - Well Developed] : well developed [General Appearance - Well Nourished] : well nourished [Well Groomed] : well groomed [No Deformities] : no deformities [Normal Conjunctiva] : the conjunctiva exhibited no abnormalities [General Appearance - In No Acute Distress] : no acute distress [Eyelids - No Xanthelasma] : the eyelids demonstrated no xanthelasmas [No Oral Pallor] : no oral pallor [Normal Oral Mucosa] : normal oral mucosa [No Oral Cyanosis] : no oral cyanosis [Normal Jugular Venous A Waves Present] : normal jugular venous A waves present [Normal Jugular Venous V Waves Present] : normal jugular venous V waves present [No Jugular Venous Mahan A Waves] : no jugular venous mahan A waves [Respiration, Rhythm And Depth] : normal respiratory rhythm and effort [Exaggerated Use Of Accessory Muscles For Inspiration] : no accessory muscle use [Auscultation Breath Sounds / Voice Sounds] : lungs were clear to auscultation bilaterally [Chest Palpation] : palpation of the chest revealed no abnormalities [Heart Rate And Rhythm] : heart rate and rhythm were normal [Edema] : no peripheral edema present [Heart Sounds] : normal S1 and S2 [FreeTextEntry1] : 2/6 systolic murmur at upper sternal border [Abdomen Soft] : soft [Abdomen Tenderness] : non-tender [Abdomen Mass (___ Cm)] : no abdominal mass palpated [Abnormal Walk] : normal gait [Gait - Sufficient For Exercise Testing] : the gait was sufficient for exercise testing [Nail Clubbing] : no clubbing of the fingernails [Cyanosis, Localized] : no localized cyanosis [Petechial Hemorrhages (___cm)] : no petechial hemorrhages [Skin Color & Pigmentation] : normal skin color and pigmentation [No Venous Stasis] : no venous stasis [] : no rash [Skin Lesions] : no skin lesions [No Skin Ulcers] : no skin ulcer [Oriented To Time, Place, And Person] : oriented to person, place, and time [No Xanthoma] : no  xanthoma was observed [Mood] : the mood was normal [Affect] : the affect was normal [No Anxiety] : not feeling anxious

## 2020-02-24 NOTE — REVIEW OF SYSTEMS
[Chest  Pressure] : no chest pressure [Feeling Fatigued] : feeling fatigued [Shortness Of Breath] : shortness of breath [Cough] : no cough [Dizziness] : dizziness [Chest Pain] : no chest pain [Negative] : Heme/Lymph

## 2020-02-24 NOTE — HISTORY OF PRESENT ILLNESS
[FreeTextEntry1] : 74F with HTN, DM (6.0), HLD, CAD s/p multiple stents ([facility] - last 2013) who presents for follow up, syncope, chest pain.\par \par Recent visit to PCP, noted with anemia, started on Iron, folic acid\par Notes burning in her legs while laying down, sleeping at night\par Not particularly active at all\par No recent syncopal episodes\par No further chest pain. \par Chronic, ongoing shortness of breath. \par She is intermittently taking her medications\par 48 hour Holter unremarkable other than a 9 beat run of AT\par \par HISTORY:\par \par Pt had syncopal episode at [facility] last week. [place] it coming, felt lightheaded and next thing she knew she was on the floor.  She refused an ambulance.  \par Has had multiple syncopal and near syncopal episodes over the last few months. She notes she has had these symptoms for a long time. She does not eat or drink appropriately. Symptoms for at least a few years. \par \par Former smoker, (quit Jan 2019). Retired  \par \par ECG: SR with RBBB, nonspecific ST-T wave changes\par TTE 2/2019: Normal LV function, severe concentric LVH, MAC, mild MS, mild AS, mild LAE\par NST: EF 53%, moderate fixed defect anterior wall, small mild defect basal inferior, basal inferoseptal walls\par Carotid 2/2019: Bilateral <50% ICA stenosis, R ECA >50% stenosis \par Lipids: 183/38/120/141\par Holter: SR, rare PAC's, 9 beat run AT, rare PVC's\par \par Norvasc 10mg, atenolol 100mg, plavix 75mg, irbesartan 300mg, lipitor 40mg, terazosin 10mg

## 2020-02-24 NOTE — DISCUSSION/SUMMARY
[FreeTextEntry1] : 74F with CAD, DM, HTN, HLD, carotid stenosis, who presents for follow up of HTN, dizziness, fatigue\par \par 1. Syncope:  Longstanding, multiple episodes. No recent episodes. The etiology is not clear and there are many possible causes.\par \par -Continue to encourage po intake, hydration\par -Consider scaling back on BP meds however she has a tendency to run very high BP. She is very hesitant to change her meds. Would favor lowering the dose of the beta blocker (she is hesitant due to her palpitations) or terazosin.\par -Off all agents for DM\par -Now on iron supplementation for anemia\par -Following with neurology\par -Med compliance strongly stressed, appropriate sleep hygiene, eating\par \par 2. CAD:  Cont Plavix monotherapy. Infarct without ischemia on recent stress. Nonobstructive carotid disease. Cont statin. Recheck lipids\par \par 3. HTN: BP is slightly high today. Continue current medications. \par \par 4. GUTIERREZ: ?Deconditioning. Check echo \par \par RV 3 months\par

## 2020-03-17 ENCOUNTER — APPOINTMENT (OUTPATIENT)
Dept: INTERNAL MEDICINE | Facility: CLINIC | Age: 75
End: 2020-03-17

## 2020-03-23 ENCOUNTER — APPOINTMENT (OUTPATIENT)
Dept: FAMILY MEDICINE | Facility: CLINIC | Age: 75
End: 2020-03-23

## 2020-04-02 ENCOUNTER — RX RENEWAL (OUTPATIENT)
Age: 75
End: 2020-04-02

## 2020-04-13 ENCOUNTER — RX RENEWAL (OUTPATIENT)
Age: 75
End: 2020-04-13

## 2020-04-14 ENCOUNTER — RX RENEWAL (OUTPATIENT)
Age: 75
End: 2020-04-14

## 2020-05-06 ENCOUNTER — RX RENEWAL (OUTPATIENT)
Age: 75
End: 2020-05-06

## 2020-05-18 ENCOUNTER — APPOINTMENT (OUTPATIENT)
Dept: FAMILY MEDICINE | Facility: CLINIC | Age: 75
End: 2020-05-18
Payer: MEDICARE

## 2020-05-18 VITALS
SYSTOLIC BLOOD PRESSURE: 150 MMHG | OXYGEN SATURATION: 97 % | HEIGHT: 67 IN | HEART RATE: 75 BPM | DIASTOLIC BLOOD PRESSURE: 60 MMHG

## 2020-05-18 DIAGNOSIS — G47.00 INSOMNIA, UNSPECIFIED: ICD-10-CM

## 2020-05-18 DIAGNOSIS — F32.9 MAJOR DEPRESSIVE DISORDER, SINGLE EPISODE, UNSPECIFIED: ICD-10-CM

## 2020-05-18 PROCEDURE — 36415 COLL VENOUS BLD VENIPUNCTURE: CPT

## 2020-05-18 PROCEDURE — 99214 OFFICE O/P EST MOD 30 MIN: CPT | Mod: 25

## 2020-05-18 NOTE — REVIEW OF SYSTEMS
[Anxiety] : anxiety [Depression] : depression [Negative] : Heme/Lymph [de-identified] : occasional dizziness

## 2020-05-18 NOTE — ASSESSMENT
[FreeTextEntry1] : near syncope\par only one occurrence in 12/2019, needs to see cardio\par \par anemia\par patient with low iron, was advised to take iron, however patient refused\par patient refuses iron, explained that it can be a cause of her syncope, however still refuses against medical advice\par \par mood\par Discussed diagnosis of anxiety and depression with the patient and potential outcomes/side affects of treatment versus non treatment. Medications were assessed and described at length. Side affects and black box warning were discussed.  Patient was advised to continue will all medications prescribed and the need for compliance was discussed and emphasized. Patient was advised to not stop medications without discussing with a health care provider first. Patient was advised to continue psychotherapy or seek therapy if not currently attending. Patient was educated on addictive potential of controlled substances and was counseled to use only as needed and sparingly. Patient verbalized understanding of all the above.\par off buproprion\par mood very up and down - described as quirky\par uses valium as needed for anxiety\par \par insomnia\par does well with ambien, allows her sleep, only uses as needed, not every night\par \par diabetes\par The diagnosis of diabetes is established with this patient.  Blood work was drawn in office and results will be reviewed and followed. The patient was counseled on using a low sugar and carbohydrate diet.  Patient was advised to eat small meals and exercise regularly. Patient as advised to take all medications as prescribed and to follow up with yearly podiatry and opthalmology visits. Patient was advised to call office  or go to the ER immediately if experiences any nausea, lightheadedness or for any other issues.\par meds were stopped due to low sugars, and due to her passing out\par last a1c was 6.0\par \par thyroid\par Patient had a diagnosis of thyroid disorder. Blood was drawn to assess levels of TSH and T4. Patient will continue on current dose of medications at this time unless instructed otherwise. Patient was advised to take thyroid medications on a empty stomach and to wait at least 45 minutes before eating for appropriate absorption.\par last visit, thyroid medications were increased, will retest today\par

## 2020-05-18 NOTE — HISTORY OF PRESENT ILLNESS
[de-identified] : 75 year old female is here for a followup visit. Patient is here for medication renewals and for blood work discussion. Medications and allergies were reviewed and assessed.  There has been no new medications since the last visit. Patient is feeling well with no active changes or issues since Her last visit.\par \par \par

## 2020-05-18 NOTE — HEALTH RISK ASSESSMENT
[] : No [No] : No [No falls in past year] : Patient reported no falls in the past year [3] : 2) Feeling down, depressed, or hopeless for nearly every day (3) [[id number]] : 6 [Fair] :  ~his/her~ mood as fair [Patient declined mammogram] : Patient declined mammogram [Patient declined PAP Smear] : Patient declined PAP Smear [Patient declined bone density test] : Patient declined bone density test [Patient declined colonoscopy] : Patient declined colonoscopy [HIV test declined] : HIV test declined [Hepatitis C test declined] : Hepatitis C test declined [With Significant Other] : lives with significant other [] :  [# Of Children ___] : has [unfilled] children [Fully functional (bathing, dressing, toileting, transferring, walking, feeding)] : Fully functional (bathing, dressing, toileting, transferring, walking, feeding) [Fully functional (using the telephone, shopping, preparing meals, housekeeping, doing laundry, using] : Fully functional and needs no help or supervision to perform IADLs (using the telephone, shopping, preparing meals, housekeeping, doing laundry, using transportation, managing medications and managing finances) [Smoke Detector] : smoke detector [Seat Belt] :  uses seat belt

## 2020-05-18 NOTE — PHYSICAL EXAM
[Well Nourished] : well nourished [Regular Rhythm] : with a regular rhythm [Clear to Auscultation] : lungs were clear to auscultation bilaterally [Normal S1, S2] : normal S1 and S2 [Normal Insight/Judgement] : insight and judgment were intact [No CVA Tenderness] : no CVA  tenderness

## 2020-06-17 ENCOUNTER — RX RENEWAL (OUTPATIENT)
Age: 75
End: 2020-06-17

## 2020-08-01 ENCOUNTER — RX RENEWAL (OUTPATIENT)
Age: 75
End: 2020-08-01

## 2020-08-02 ENCOUNTER — RX RENEWAL (OUTPATIENT)
Age: 75
End: 2020-08-02

## 2020-08-03 ENCOUNTER — RX RENEWAL (OUTPATIENT)
Age: 75
End: 2020-08-03

## 2020-08-11 ENCOUNTER — APPOINTMENT (OUTPATIENT)
Dept: FAMILY MEDICINE | Facility: CLINIC | Age: 75
End: 2020-08-11
Payer: MEDICARE

## 2020-08-11 VITALS — SYSTOLIC BLOOD PRESSURE: 150 MMHG | DIASTOLIC BLOOD PRESSURE: 80 MMHG

## 2020-08-11 DIAGNOSIS — Z00.00 ENCOUNTER FOR GENERAL ADULT MEDICAL EXAMINATION W/OUT ABNORMAL FINDINGS: ICD-10-CM

## 2020-08-11 PROCEDURE — G0439: CPT

## 2020-08-11 PROCEDURE — G0447 BEHAVIOR COUNSEL OBESITY 15M: CPT

## 2020-08-11 PROCEDURE — G0444 DEPRESSION SCREEN ANNUAL: CPT

## 2020-08-11 RX ORDER — KETOCONAZOLE 20 MG/G
2 CREAM TOPICAL TWICE DAILY
Qty: 1 | Refills: 5 | Status: ACTIVE | COMMUNITY
Start: 2017-12-20 | End: 1900-01-01

## 2020-08-11 NOTE — REVIEW OF SYSTEMS
[Anxiety] : anxiety [Depression] : depression [Negative] : Heme/Lymph [de-identified] : occasional dizziness

## 2020-08-11 NOTE — PHYSICAL EXAM
[Well Nourished] : well nourished [Clear to Auscultation] : lungs were clear to auscultation bilaterally [Regular Rhythm] : with a regular rhythm [No CVA Tenderness] : no CVA  tenderness [Normal Insight/Judgement] : insight and judgment were intact [Normal S1, S2] : normal S1 and S2

## 2020-08-11 NOTE — HEALTH RISK ASSESSMENT
[Good] : ~his/her~  mood as  good [] : No [No] : No [No falls in past year] : Patient reported no falls in the past year [3] : 2) Feeling down, depressed, or hopeless for nearly every day (3) [[id number]] : 6 [Patient declined mammogram] : Patient declined mammogram [Patient declined PAP Smear] : Patient declined PAP Smear [Patient declined bone density test] : Patient declined bone density test [HIV test declined] : HIV test declined [Hepatitis C test declined] : Hepatitis C test declined [Patient declined colonoscopy] : Patient declined colonoscopy [With Significant Other] : lives with significant other [] :  [# Of Children ___] : has [unfilled] children [Fully functional (bathing, dressing, toileting, transferring, walking, feeding)] : Fully functional (bathing, dressing, toileting, transferring, walking, feeding) [Smoke Detector] : smoke detector [Fully functional (using the telephone, shopping, preparing meals, housekeeping, doing laundry, using] : Fully functional and needs no help or supervision to perform IADLs (using the telephone, shopping, preparing meals, housekeeping, doing laundry, using transportation, managing medications and managing finances) [Seat Belt] :  uses seat belt

## 2020-08-11 NOTE — ASSESSMENT
[FreeTextEntry1] : near syncope\par only one occurrence in 12/2019, is following with cardio \par \par anemia\par patient with low iron, was advised to take iron, however patient refused\par patient refuses iron, explained that it can be a cause of her syncope, however still refuses against medical advice\par reviewed last bw, iron was normal\par \par mood\par Discussed diagnosis of anxiety and depression with the patient and potential outcomes/side affects of treatment versus non treatment. Medications were assessed and described at length. Side affects and black box warning were discussed.  Patient was advised to continue will all medications prescribed and the need for compliance was discussed and emphasized. Patient was advised to not stop medications without discussing with a health care provider first. Patient was advised to continue psychotherapy or seek therapy if not currently attending. Patient was educated on addictive potential of controlled substances and was counseled to use only as needed and sparingly. Patient verbalized understanding of all the above.\par off buproprion\par mood very up and down - described as quirky\par uses valium as needed for anxiety\par \par insomnia\par does well with ambien, allows her sleep, only uses as needed, not every night\par \par diabetes\par The diagnosis of diabetes is established with this patient.  Blood work was drawn in office and results will be reviewed and followed. The patient was counseled on using a low sugar and carbohydrate diet.  Patient was advised to eat small meals and exercise regularly. Patient as advised to take all medications as prescribed and to follow up with yearly podiatry and opthalmology visits. Patient was advised to call office  or go to the ER immediately if experiences any nausea, lightheadedness or for any other issues.\par meds were stopped due to low sugars, and due to her passing out\par last a1c was 6.1\par \par thyroid\par Patient had a diagnosis of thyroid disorder.  Patient will continue on current dose of medications at this time unless instructed otherwise. Patient was advised to take thyroid medications on a empty stomach and to wait at least 45 minutes before eating for appropriate absorption.\par last visit, thyroid medications were increased, will retest today\par

## 2020-08-11 NOTE — HISTORY OF PRESENT ILLNESS
[de-identified] : 75 year old female  here for annual well visit. Patient's blood work was drawn and medications reviewed. Patient's past medical history was reviewed, allergies verified and problems were identified and assessed. Patients medications were reviewed. Patient is feeling well with no new or active complaints at this time.\par \par \par \par

## 2020-08-11 NOTE — COUNSELING
[Needs reinforcement, provided] : Patient needs reinforcement on understanding lifestyle changes and  the steps needed to achieve self management goals and reinforcement was provided [Patient motivation] : Patient motivation

## 2020-08-14 ENCOUNTER — NON-APPOINTMENT (OUTPATIENT)
Age: 75
End: 2020-08-14

## 2020-08-14 ENCOUNTER — APPOINTMENT (OUTPATIENT)
Dept: CARDIOLOGY | Facility: CLINIC | Age: 75
End: 2020-08-14
Payer: MEDICARE

## 2020-08-14 VITALS
OXYGEN SATURATION: 97 % | WEIGHT: 190 LBS | HEART RATE: 58 BPM | SYSTOLIC BLOOD PRESSURE: 140 MMHG | TEMPERATURE: 98.2 F | DIASTOLIC BLOOD PRESSURE: 58 MMHG | BODY MASS INDEX: 29.76 KG/M2

## 2020-08-14 PROCEDURE — 99214 OFFICE O/P EST MOD 30 MIN: CPT

## 2020-08-14 PROCEDURE — 93000 ELECTROCARDIOGRAM COMPLETE: CPT

## 2020-08-14 NOTE — HISTORY OF PRESENT ILLNESS
[FreeTextEntry1] : 74F with HTN, DM (6.0), HLD, CAD s/p multiple stents ([facility] - last 2013) who presents for follow up, syncope, chest pain.\par \par Echo cancelled due to COVID\par No further syncopal episodes\par Has been mostly home due to COVID\par Not particularly active at all\par No further chest pain. \par Chronic, ongoing shortness of breath. \par She is intermittently taking her medications\par 48 hour Holter unremarkable other than a 9 beat run of AT\par \par HISTORY:\par \par 12/19- Pt had syncopal episode at [facility]. [place] it coming, felt lightheaded and next thing she knew she was on the floor.  She refused an ambulance.  \par Has had multiple syncopal and near syncopal episodes over the last few months. She notes she has had these symptoms for a long time. She does not eat or drink appropriately. Symptoms for at least a few years. \par \par Former smoker, (quit Jan 2019). Retired  \par \par ECG: SR with RBBB, nonspecific ST-T wave changes\par TTE 2/2019: Normal LV function, severe concentric LVH, MAC, mild MS, mild AS, mild LAE\par NST: EF 53%, moderate fixed defect anterior wall, small mild defect basal inferior, basal inferoseptal walls\par Carotid 2/2019: Bilateral <50% ICA stenosis, R ECA >50% stenosis \par Lipids: 183/38/120/141\par Holter: SR, rare PAC's, 9 beat run AT, rare PVC's\par \par Norvasc 10mg, atenolol 100mg, plavix 75mg, irbesartan 300mg, lipitor 40mg, terazosin 10mg

## 2020-08-14 NOTE — DISCUSSION/SUMMARY
[FreeTextEntry1] : 74F with CAD, DM, HTN, HLD, carotid stenosis, who presents for follow up of HTN, dizziness, fatigue\par \par 1. Syncope:  No further episodes as of late. The etiology is not clear and there are many possible causes.\par \par -Continue to encourage po intake, hydration\par -Consider scaling back on BP meds however she has a tendency to run very high BP. She is very hesitant to change her meds. Would favor lowering the dose of the beta blocker (she is hesitant due to her palpitations) or terazosin.\par -Off all agents for DM\par -Now on iron supplementation for anemia\par -Following with neurology\par -Med compliance strongly stressed, appropriate sleep hygiene, eating\par \par 2. CAD:  Cont Plavix monotherapy. Infarct without ischemia on recent stress. Nonobstructive carotid disease. Cont statin. Lipids borderline\par \par 3. HTN: BP is borderline. Continue current medications. \par \par 4. GUTIERREZ: ?Deconditioning. Check echo \par \par RV 3 months\par Echo \par

## 2020-09-10 RX ORDER — ATORVASTATIN CALCIUM 40 MG/1
40 TABLET, FILM COATED ORAL
Qty: 90 | Refills: 1 | Status: ACTIVE | COMMUNITY
Start: 2019-05-28 | End: 1900-01-01

## 2020-09-15 ENCOUNTER — APPOINTMENT (OUTPATIENT)
Dept: INTERNAL MEDICINE | Facility: CLINIC | Age: 75
End: 2020-09-15
Payer: MEDICARE

## 2020-09-15 PROCEDURE — 93306 TTE W/DOPPLER COMPLETE: CPT

## 2020-09-24 ENCOUNTER — RX RENEWAL (OUTPATIENT)
Age: 75
End: 2020-09-24

## 2020-10-22 ENCOUNTER — RX RENEWAL (OUTPATIENT)
Age: 75
End: 2020-10-22

## 2020-11-02 ENCOUNTER — RX RENEWAL (OUTPATIENT)
Age: 75
End: 2020-11-02

## 2020-11-10 ENCOUNTER — APPOINTMENT (OUTPATIENT)
Dept: FAMILY MEDICINE | Facility: CLINIC | Age: 75
End: 2020-11-10

## 2020-11-11 ENCOUNTER — APPOINTMENT (OUTPATIENT)
Dept: FAMILY MEDICINE | Facility: CLINIC | Age: 75
End: 2020-11-11
Payer: MEDICARE

## 2020-11-11 VITALS
HEIGHT: 67 IN | HEART RATE: 57 BPM | OXYGEN SATURATION: 97 % | WEIGHT: 190 LBS | SYSTOLIC BLOOD PRESSURE: 170 MMHG | BODY MASS INDEX: 29.82 KG/M2 | DIASTOLIC BLOOD PRESSURE: 56 MMHG

## 2020-11-11 DIAGNOSIS — M79.10 MYALGIA, UNSPECIFIED SITE: ICD-10-CM

## 2020-11-11 PROCEDURE — 36415 COLL VENOUS BLD VENIPUNCTURE: CPT

## 2020-11-11 PROCEDURE — 99072 ADDL SUPL MATRL&STAF TM PHE: CPT

## 2020-11-11 PROCEDURE — 99215 OFFICE O/P EST HI 40 MIN: CPT | Mod: 25

## 2020-11-11 NOTE — HEALTH RISK ASSESSMENT
[] : No [No] : No [No falls in past year] : Patient reported no falls in the past year [3] : 2) Feeling down, depressed, or hopeless for nearly every day (3) [[id number]] : 6 [Good] : ~his/her~  mood as  good [Patient declined mammogram] : Patient declined mammogram [Patient declined PAP Smear] : Patient declined PAP Smear [Patient declined bone density test] : Patient declined bone density test [Patient declined colonoscopy] : Patient declined colonoscopy [HIV test declined] : HIV test declined [Hepatitis C test declined] : Hepatitis C test declined [With Significant Other] : lives with significant other [] :  [# Of Children ___] : has [unfilled] children [Fully functional (bathing, dressing, toileting, transferring, walking, feeding)] : Fully functional (bathing, dressing, toileting, transferring, walking, feeding) [Fully functional (using the telephone, shopping, preparing meals, housekeeping, doing laundry, using] : Fully functional and needs no help or supervision to perform IADLs (using the telephone, shopping, preparing meals, housekeeping, doing laundry, using transportation, managing medications and managing finances) [Smoke Detector] : smoke detector [Seat Belt] :  uses seat belt

## 2020-11-11 NOTE — REVIEW OF SYSTEMS
[Anxiety] : anxiety [Depression] : depression [Negative] : Heme/Lymph [FreeTextEntry9] : leg pain [de-identified] : occasional dizziness

## 2020-11-11 NOTE — HISTORY OF PRESENT ILLNESS
[de-identified] : 75 year old female is here for a followup visit. Patient is here for medication renewals and for blood work discussion. Medications and allergies were reviewed and assessed.  There has been no new medications since the last visit. Patient is feeling well with no active changes or issues since Her last visit.\par \par \par

## 2020-11-11 NOTE — ASSESSMENT
[FreeTextEntry1] : near syncope/syncope\par only one occurrence in 12/2019, is following with cardio \par [facility]\par must see cardio today, will be seen\par must see a neurologist\par \par myalgia\par will check bw\par \par anemia\par patient with low iron, was advised to take iron, however patient refused\par patient refuses iron, explained that it can be a cause of her syncope, however still refuses against medical advice\par reviewed last bw, iron was normal\par \par mood\par Discussed diagnosis of anxiety and depression with the patient and potential outcomes/side affects of treatment versus non treatment. Medications were assessed and described at length. Side affects and black box warning were discussed.  Patient was advised to continue will all medications prescribed and the need for compliance was discussed and emphasized. Patient was advised to not stop medications without discussing with a health care provider first. Patient was advised to continue psychotherapy or seek therapy if not currently attending. Patient was educated on addictive potential of controlled substances and was counseled to use only as needed and sparingly. Patient verbalized understanding of all the above.\par off buproprion\par mood very up and down - described as quirky\par uses valium as needed for anxiety\par \par insomnia\par does well with ambien, allows her sleep, only uses as needed, not every night\par \par diabetes\par The diagnosis of diabetes is established with this patient.  Blood work was drawn in office and results will be reviewed and followed. The patient was counseled on using a low sugar and carbohydrate diet.  Patient was advised to eat small meals and exercise regularly. Patient as advised to take all medications as prescribed and to follow up with yearly podiatry and opthalmology visits. Patient was advised to call office  or go to the ER immediately if experiences any nausea, lightheadedness or for any other issues.\par meds were stopped due to low sugars, and due to her passing out\par last a1c was 6.1\par \par thyroid\par Patient had a diagnosis of thyroid disorder.  Patient will continue on current dose of medications at this time unless instructed otherwise. Patient was advised to take thyroid medications on a empty stomach and to wait at least 45 minutes before eating for appropriate absorption.\par last visit, thyroid medications were increased, will retest today\par \par \par achiness\par will check bw

## 2020-11-12 ENCOUNTER — NON-APPOINTMENT (OUTPATIENT)
Age: 75
End: 2020-11-12

## 2020-11-12 ENCOUNTER — APPOINTMENT (OUTPATIENT)
Dept: CARDIOLOGY | Facility: CLINIC | Age: 75
End: 2020-11-12
Payer: MEDICARE

## 2020-11-12 VITALS — HEART RATE: 78 BPM | DIASTOLIC BLOOD PRESSURE: 72 MMHG | SYSTOLIC BLOOD PRESSURE: 165 MMHG

## 2020-11-12 PROCEDURE — 99214 OFFICE O/P EST MOD 30 MIN: CPT

## 2020-11-12 PROCEDURE — 93000 ELECTROCARDIOGRAM COMPLETE: CPT

## 2020-11-12 PROCEDURE — 99072 ADDL SUPL MATRL&STAF TM PHE: CPT

## 2020-11-12 NOTE — DISCUSSION/SUMMARY
[FreeTextEntry1] : 75F with CAD, DM, HTN, HLD, carotid stenosis, who presents for follow up of HTN, dizziness, fatigue\par \par 1. Syncope: Another episode recently. All of her episodes seem to occur while standing in line, suspect orthostasis from age, BP meds. Arrhythmia work up, echo and stress, all unchanged from prior. She does not eat or drink enough. She drinks a lot of Diet Coke. \par \par -Caution with standing for too long\par -Encourage po intake, hydration\par -Consider scaling back on BP meds however she has a tendency to run very high BP. She is very hesitant to change her meds. Would favor lowering the dose of the beta blocker (she is hesitant due to her palpitations) or terazosin.\par -Off all agents for DM\par -Now on iron supplementation for anemia\par -Following with neurology\par -Med compliance strongly stressed, appropriate sleep hygiene, eating\par \par 2. CAD:  Cont Plavix monotherapy. Infarct without ischemia on recent stress. Nonobstructive carotid disease. Cont statin. Lipids borderline\par \par 3. HTN: BP is high today but fluctuates and possible orthostasis. Continue current medications. \par \par RV 3 months\par \par

## 2020-11-12 NOTE — HISTORY OF PRESENT ILLNESS
[FreeTextEntry1] : 74F with HTN, DM (6.0), HLD, CAD s/p multiple stents ([facility] - last 2013) who presents for follow up, syncope,\par \par Had a syncopal episode again a few weeks ago, while waiting in line at [facility]- hadn't eaten all day. She refuses to go to the hospital with these episodes\par She felt warm, sweaty beforehand, and then passed out\par All of her previous episodes have occurred while standing in line at [employer] \par She otherwise does not stand that much, she spends most of her time in bed\par Chronic, ongoing shortness of breath. \par \par \par HISTORY:\par \par 12/19- Pt had syncopal episode at [facility]. [place] it coming, felt lightheaded and next thing she knew she was on the floor.  She refused an ambulance.  \par Has had multiple syncopal and near syncopal episodes over the last few months. She notes she has had these symptoms for a long time. She does not eat or drink appropriately. Symptoms for at least a few years. \par \par Former smoker, (quit Jan 2019). Retired  \par \par ECG: SR with RBBB, nonspecific ST-T wave changes\par TTE 9/2020: Normal LV function, severe concentric LVH, MAC, mild MS, mild AS, mild LAE\par NST: EF 53%, moderate fixed defect anterior wall, small mild defect basal inferior, basal inferoseptal walls\par Carotid 2/2019: Bilateral <50% ICA stenosis, R ECA >50% stenosis \par Lipids: 183/38/120/141\par Holter: SR, rare PAC's, 9 beat run AT, rare PVC's\par \par Norvasc 10mg, atenolol 100mg, plavix 75mg, irbesartan 300mg, lipitor 40mg, terazosin 10mg

## 2020-11-13 ENCOUNTER — NON-APPOINTMENT (OUTPATIENT)
Age: 75
End: 2020-11-13

## 2020-11-15 ENCOUNTER — RX RENEWAL (OUTPATIENT)
Age: 75
End: 2020-11-15

## 2020-11-15 RX ORDER — ATENOLOL 100 MG/1
100 TABLET ORAL
Qty: 180 | Refills: 1 | Status: ACTIVE | COMMUNITY
Start: 2017-12-20 | End: 1900-01-01

## 2020-12-14 ENCOUNTER — TRANSCRIPTION ENCOUNTER (OUTPATIENT)
Age: 75
End: 2020-12-14

## 2021-01-12 ENCOUNTER — RX RENEWAL (OUTPATIENT)
Age: 76
End: 2021-01-12

## 2021-01-29 ENCOUNTER — TRANSCRIPTION ENCOUNTER (OUTPATIENT)
Age: 76
End: 2021-01-29

## 2021-03-16 ENCOUNTER — APPOINTMENT (OUTPATIENT)
Dept: FAMILY MEDICINE | Facility: CLINIC | Age: 76
End: 2021-03-16
Payer: MEDICARE

## 2021-03-16 VITALS
HEIGHT: 67 IN | SYSTOLIC BLOOD PRESSURE: 130 MMHG | WEIGHT: 184 LBS | DIASTOLIC BLOOD PRESSURE: 80 MMHG | TEMPERATURE: 98.2 F | HEART RATE: 80 BPM | BODY MASS INDEX: 28.88 KG/M2 | OXYGEN SATURATION: 98 %

## 2021-03-16 DIAGNOSIS — D64.9 ANEMIA, UNSPECIFIED: ICD-10-CM

## 2021-03-16 DIAGNOSIS — E03.9 HYPOTHYROIDISM, UNSPECIFIED: ICD-10-CM

## 2021-03-16 DIAGNOSIS — E11.9 TYPE 2 DIABETES MELLITUS W/OUT COMPLICATIONS: ICD-10-CM

## 2021-03-16 DIAGNOSIS — F41.9 ANXIETY DISORDER, UNSPECIFIED: ICD-10-CM

## 2021-03-16 PROCEDURE — 99214 OFFICE O/P EST MOD 30 MIN: CPT | Mod: 25

## 2021-03-16 PROCEDURE — 99072 ADDL SUPL MATRL&STAF TM PHE: CPT

## 2021-03-16 PROCEDURE — 36415 COLL VENOUS BLD VENIPUNCTURE: CPT

## 2021-03-16 RX ORDER — DIAZEPAM 5 MG/1
5 TABLET ORAL TWICE DAILY
Qty: 30 | Refills: 0 | Status: ACTIVE | COMMUNITY
Start: 2017-12-20 | End: 1900-01-01

## 2021-03-16 RX ORDER — ZOLPIDEM TARTRATE 10 MG/1
10 TABLET ORAL
Qty: 30 | Refills: 2 | Status: ACTIVE | COMMUNITY
Start: 2019-11-19 | End: 1900-01-01

## 2021-03-16 NOTE — ASSESSMENT
[FreeTextEntry1] : near syncope/syncope\par only one occurrence in 12/2019, is following with cardio \par [facility]\par \par saw neurologist\par reports that is there is nothing wrong with her\par \par myalgia\par will check bw\par \par anemia\par patient with low iron, was advised to take iron, however patient refused\par patient refuses iron, explained that it can be a cause of her syncope, however still refuses against medical advice\par reviewed last bw, iron was normal\par \par mood\par Discussed diagnosis of anxiety and depression with the patient and potential outcomes/side affects of treatment versus non treatment. Medications were assessed and described at length. Side affects and black box warning were discussed.  Patient was advised to continue will all medications prescribed and the need for compliance was discussed and emphasized. Patient was advised to not stop medications without discussing with a health care provider first. Patient was advised to continue psychotherapy or seek therapy if not currently attending. Patient was educated on addictive potential of controlled substances and was counseled to use only as needed and sparingly. Patient verbalized understanding of all the above.\par off buproprion\par mood very up and down - described as quirky\par uses valium as needed for anxiety\par \par insomnia\par does well with ambien, allows her sleep, only uses as needed, not every night\par \par diabetes\par The diagnosis of diabetes is established with this patient.  Blood work was drawn in office and results will be reviewed and followed. The patient was counseled on using a low sugar and carbohydrate diet.  Patient was advised to eat small meals and exercise regularly. Patient as advised to take all medications as prescribed and to follow up with yearly podiatry and opthalmology visits. Patient was advised to call office  or go to the ER immediately if experiences any nausea, lightheadedness or for any other issues.\par meds were stopped due to low sugars, and due to her passing out\par last a1c was 6.1\par \par thyroid\par Patient had a diagnosis of thyroid disorder.  Patient will continue on current dose of medications at this time unless instructed otherwise. Patient was advised to take thyroid medications on a empty stomach and to wait at least 45 minutes before eating for appropriate absorption.\par last visit, thyroid medications were increased, will retest today\par \par \par achiness\par will check bw

## 2021-03-16 NOTE — HISTORY OF PRESENT ILLNESS
[de-identified] : 75 year old female is here for a followup visit. Patient is here for medication renewals and for blood work discussion. Medications and allergies were reviewed and assessed.  There has been no new medications since the last visit. Patient is feeling well with no active changes or issues since Her last visit.\par \par

## 2021-03-16 NOTE — REVIEW OF SYSTEMS
[Anxiety] : anxiety [Depression] : depression [Negative] : Heme/Lymph [de-identified] : occasional dizziness

## 2021-03-16 NOTE — HEALTH RISK ASSESSMENT
[] : No [No] : No [No falls in past year] : Patient reported no falls in the past year [3] : 2) Feeling down, depressed, or hopeless for nearly every day (3) [[id number]] : 6 [Good] : ~his/her~  mood as  good [Patient declined mammogram] : Patient declined mammogram [Patient declined PAP Smear] : Patient declined PAP Smear [Patient declined bone density test] : Patient declined bone density test [Patient declined colonoscopy] : Patient declined colonoscopy [HIV test declined] : HIV test declined [Hepatitis C test declined] : Hepatitis C test declined [With Significant Other] : lives with significant other [] :  [# Of Children ___] : has [unfilled] children [Fully functional (bathing, dressing, toileting, transferring, walking, feeding)] : Fully functional (bathing, dressing, toileting, transferring, walking, feeding) [Fully functional (using the telephone, shopping, preparing meals, housekeeping, doing laundry, using] : Fully functional and needs no help or supervision to perform IADLs (using the telephone, shopping, preparing meals, housekeeping, doing laundry, using transportation, managing medications and managing finances) [Smoke Detector] : smoke detector [Seat Belt] :  uses seat belt

## 2021-03-18 ENCOUNTER — RX RENEWAL (OUTPATIENT)
Age: 76
End: 2021-03-18

## 2021-03-18 RX ORDER — CLOPIDOGREL BISULFATE 75 MG/1
75 TABLET, FILM COATED ORAL DAILY
Qty: 90 | Refills: 1 | Status: ACTIVE | COMMUNITY
Start: 2019-05-28 | End: 1900-01-01

## 2021-03-21 ENCOUNTER — RX RENEWAL (OUTPATIENT)
Age: 76
End: 2021-03-21

## 2021-03-21 RX ORDER — IRBESARTAN 300 MG/1
300 TABLET ORAL
Qty: 90 | Refills: 1 | Status: ACTIVE | COMMUNITY
Start: 2017-12-28 | End: 1900-01-01

## 2021-04-05 ENCOUNTER — LABORATORY RESULT (OUTPATIENT)
Age: 76
End: 2021-04-05

## 2021-04-05 ENCOUNTER — APPOINTMENT (OUTPATIENT)
Dept: CARDIOLOGY | Facility: CLINIC | Age: 76
End: 2021-04-05
Payer: MEDICARE

## 2021-04-05 VITALS — HEIGHT: 67 IN | BODY MASS INDEX: 28.88 KG/M2 | WEIGHT: 184 LBS

## 2021-04-05 VITALS — DIASTOLIC BLOOD PRESSURE: 58 MMHG | SYSTOLIC BLOOD PRESSURE: 174 MMHG

## 2021-04-05 LAB
BASOPHILS # BLD AUTO: 0.02 K/UL
BASOPHILS NFR BLD AUTO: 0.3 %
EOSINOPHIL # BLD AUTO: 0.05 K/UL
EOSINOPHIL NFR BLD AUTO: 0.7 %
HCT VFR BLD CALC: 34.5 %
HGB BLD-MCNC: 10.3 G/DL
IMM GRANULOCYTES NFR BLD AUTO: 0.3 %
LYMPHOCYTES # BLD AUTO: 1.81 K/UL
LYMPHOCYTES NFR BLD AUTO: 23.7 %
MAN DIFF?: NORMAL
MCHC RBC-ENTMCNC: 27.1 PG
MCHC RBC-ENTMCNC: 29.9 GM/DL
MCV RBC AUTO: 90.8 FL
MONOCYTES # BLD AUTO: 0.49 K/UL
MONOCYTES NFR BLD AUTO: 6.4 %
NEUTROPHILS # BLD AUTO: 5.25 K/UL
NEUTROPHILS NFR BLD AUTO: 68.6 %
PLATELET # BLD AUTO: 177 K/UL
RBC # BLD: 3.8 M/UL
RBC # FLD: 15.5 %
WBC # FLD AUTO: 7.64 K/UL

## 2021-04-05 PROCEDURE — 99072 ADDL SUPL MATRL&STAF TM PHE: CPT

## 2021-04-05 PROCEDURE — 99214 OFFICE O/P EST MOD 30 MIN: CPT

## 2021-04-05 NOTE — HISTORY OF PRESENT ILLNESS
[FreeTextEntry1] : 74F with HTN, DM (6.0), HLD, CAD s/p multiple stents ([facility] - last 2013) who presents for follow up, syncope,\par \par No further syncopal episodes- typically her episodes occur while standing in line for too long at one time\par +Prodromal symptoms\par She tries to be very careful\par She is a little aggravated about having trouble calling the office, parking issues\par Otherwise she feels well, no CP, no SOB, no dizziness, no lightheadedness \par \par HISTORY:\par \par 12/19- Pt had syncopal episode at [facility]. [place] it coming, felt lightheaded and next thing she knew she was on the floor.  She refused an ambulance.  \par Has had multiple syncopal and near syncopal episodes over the last few months. She notes she has had these symptoms for a long time. She does not eat or drink appropriately. Symptoms for at least a few years.\par \par Had a syncopal episode again a few weeks ago, while waiting in line at [facility]- hadn't eaten all day. \par \par Former smoker, (quit Jan 2019). Retired  \par \par ECG: SR with RBBB, nonspecific ST-T wave changes\par TTE 9/2020: Normal LV function, severe concentric LVH, MAC, mild MS, mild AS, mild LAE\par NST: EF 53%, moderate fixed defect anterior wall, small mild defect basal inferior, basal inferoseptal walls\par Carotid 2/2019: Bilateral <50% ICA stenosis, R ECA >50% stenosis \par Lipids: 183/38/120/141\par Holter: SR, rare PAC's, 9 beat run AT, rare PVC's\par \par Norvasc 10mg, atenolol 100mg BID, plavix 75mg, irbesartan 300mg, lipitor 40mg, terazosin 10mg

## 2021-04-05 NOTE — DISCUSSION/SUMMARY
[FreeTextEntry1] : 75F with CAD, DM, HTN, HLD, carotid stenosis, who presents for follow up of HTN, dizziness, fatigue\par \par 1. Syncope: No recent episodes. Suspect vasovagal. Always with prodromal symptoms, usually while standing in line for a while, no evidence of AV block on ECG and echo within normal limits. Very labile BP readings. She does not eat or drink enough. \par \par -Caution with standing for too long\par -Encourage po intake, hydration\par -Consider scaling back on BP meds however she has a tendency to run very high BP. She is very hesitant to change her meds. Would favor lowering the dose of the beta blocker (she is hesitant due to her palpitations) or terazosin.\par -Off all agents for DM\par -Now on iron supplementation for anemia\par -Following with neurology\par -Med compliance strongly stressed, appropriate sleep hygiene, eating\par \par 2. CAD:  Cont Plavix monotherapy. Infarct without ischemia on recent stress. Nonobstructive carotid disease. Cont statin. Lipids borderline\par \par 3. HTN: BP is high today but fluctuates and possible orthostasis. Continue current medications. \par \par 4. Mild MS/AS: Serial echos. Unlikely etiology of symptoms. \par \par RV 3 months\par \par

## 2021-04-06 LAB
ALBUMIN SERPL ELPH-MCNC: 3.9 G/DL
ALP BLD-CCNC: 112 U/L
ALT SERPL-CCNC: 11 U/L
ANION GAP SERPL CALC-SCNC: 11 MMOL/L
AST SERPL-CCNC: 18 U/L
BILIRUB SERPL-MCNC: 0.2 MG/DL
BUN SERPL-MCNC: 29 MG/DL
CALCIUM SERPL-MCNC: 9.1 MG/DL
CHLORIDE SERPL-SCNC: 107 MMOL/L
CHOLEST SERPL-MCNC: 209 MG/DL
CO2 SERPL-SCNC: 23 MMOL/L
CREAT SERPL-MCNC: 1.22 MG/DL
ESTIMATED AVERAGE GLUCOSE: 128 MG/DL
FERRITIN SERPL-MCNC: 22 NG/ML
FOLATE SERPL-MCNC: 7 NG/ML
GLUCOSE SERPL-MCNC: 132 MG/DL
HBA1C MFR BLD HPLC: 6.1 %
HDLC SERPL-MCNC: 40 MG/DL
IRON SATN MFR SERPL: 8 %
IRON SERPL-MCNC: 31 UG/DL
LDLC SERPL CALC-MCNC: 140 MG/DL
NONHDLC SERPL-MCNC: 169 MG/DL
POTASSIUM SERPL-SCNC: 4.2 MMOL/L
PROT SERPL-MCNC: 6.8 G/DL
SODIUM SERPL-SCNC: 141 MMOL/L
TIBC SERPL-MCNC: 386 UG/DL
TRIGL SERPL-MCNC: 146 MG/DL
TSH SERPL-ACNC: 4.8 UIU/ML
UIBC SERPL-MCNC: 355 UG/DL
VIT B12 SERPL-MCNC: 439 PG/ML

## 2021-04-19 RX ORDER — LEVOTHYROXINE SODIUM 0.14 MG/1
137 TABLET ORAL
Qty: 90 | Refills: 0 | Status: ACTIVE | COMMUNITY
Start: 2017-12-20 | End: 1900-01-01

## 2021-04-19 RX ORDER — DOXAZOSIN 4 MG/1
4 TABLET ORAL DAILY
Qty: 90 | Refills: 1 | Status: ACTIVE | COMMUNITY
Start: 2019-05-28 | End: 1900-01-01

## 2021-04-30 ENCOUNTER — TRANSCRIPTION ENCOUNTER (OUTPATIENT)
Age: 76
End: 2021-04-30

## 2021-07-22 ENCOUNTER — NON-APPOINTMENT (OUTPATIENT)
Age: 76
End: 2021-07-22

## 2021-07-22 ENCOUNTER — APPOINTMENT (OUTPATIENT)
Dept: CARDIOLOGY | Facility: CLINIC | Age: 76
End: 2021-07-22
Payer: MEDICARE

## 2021-07-22 VITALS — DIASTOLIC BLOOD PRESSURE: 70 MMHG | HEART RATE: 50 BPM | SYSTOLIC BLOOD PRESSURE: 157 MMHG

## 2021-07-22 PROCEDURE — 93000 ELECTROCARDIOGRAM COMPLETE: CPT

## 2021-07-22 PROCEDURE — 99214 OFFICE O/P EST MOD 30 MIN: CPT

## 2021-07-22 NOTE — DISCUSSION/SUMMARY
[FreeTextEntry1] : 76F with CAD, DM, HTN, HLD, carotid stenosis, who presents for follow up of\par \par 1. Syncope: Very frequently feels faint. It is difficult to get a true assessment of her symptoms. Suspect combination of vasovagal, dehydration and lack of po intake, medications. Always with prodromal symptoms, usually while standing in line for a while, no evidence of AV block on ECG and echo within normal limits. Very labile BP readings.\par \par -Caution with standing for too long\par -Encourage po intake, hydration\par -Consider scaling back on BP meds however she has a tendency to run very high BP. She is very hesitant to change her meds. Would favor lowering the dose of the beta blocker (she is hesitant due to her palpitations) or terazosin. She is now taking a diuretic as well. \par -Off all agents for DM-last a1c 6.1\par -Med compliance strongly stressed, appropriate sleep hygiene, eating\par \par 2. CAD:  Cont Plavix monotherapy. Infarct without ischemia on recent stress. Nonobstructive carotid disease. Cont statin. Lipids borderline\par \par 3. HTN: BP is elevated but better than what it has been on prior visits. BP fluctuates and possible orthostasis. Continue current medications. \par \par 4. Mild MS/AS: Serial echos. Unlikely etiology of symptoms. \par \par RV 3 months\par Echo then \par \par

## 2021-07-22 NOTE — PHYSICAL EXAM

## 2021-07-22 NOTE — HISTORY OF PRESENT ILLNESS
[FreeTextEntry1] : 76F with HTN, DM, HLD, CAD s/p multiple stents ([facility] - last 2013), syncope, who presents for follow up\par \par She has had multiple episodes where she has felt like she was going to faint, but went to lay down and then felt better- she starts to feel very weak prior to these episodes\par She notes some reflux symptoms which is relieved by belching\par Walking short distances gets very winded\par She has felt this way for several years\par Seeing a new PCP Dr. Alfaro- was staretd on a diuretic \par \par HISTORY:\par \par 12/19- Pt had syncopal episode at [facility]. [place] it coming, felt lightheaded and next thing she knew she was on the floor.  She refused an ambulance.  \par Has had multiple syncopal and near syncopal episodes over the last few months. She notes she has had these symptoms for a long time. She does not eat or drink appropriately. Symptoms for at least a few years.\par \par Had a syncopal episode again a few weeks ago, while waiting in line at [facility]- hadn't eaten all day. \par \par Former smoker, (quit Jan 2019). Retired  \par \par ECG: SR with RBBB, nonspecific ST-T wave changes\par TTE 9/2020: Normal LV function, severe concentric LVH, MAC, mild MS, mild AS, mild LAE\par NST: EF 53%, moderate fixed defect anterior wall, small mild defect basal inferior, basal inferoseptal walls\par Carotid 2/2019: Bilateral <50% ICA stenosis, R ECA >50% stenosis \par Lipids: 183/38/120/141\par Holter: SR, rare PAC's, 9 beat run AT, rare PVC's\par \par Norvasc 10mg, atenolol 100mg BID, plavix 75mg, irbesartan 300mg, lipitor 40mg, terazosin 10mg

## 2021-08-25 ENCOUNTER — RX RENEWAL (OUTPATIENT)
Age: 76
End: 2021-08-25

## 2021-08-30 ENCOUNTER — RX RENEWAL (OUTPATIENT)
Age: 76
End: 2021-08-30

## 2021-11-08 ENCOUNTER — APPOINTMENT (OUTPATIENT)
Dept: CARDIOLOGY | Facility: CLINIC | Age: 76
End: 2021-11-08
Payer: MEDICARE

## 2021-11-08 VITALS
TEMPERATURE: 97.4 F | SYSTOLIC BLOOD PRESSURE: 147 MMHG | BODY MASS INDEX: 27.31 KG/M2 | HEIGHT: 67 IN | HEART RATE: 66 BPM | WEIGHT: 174 LBS | OXYGEN SATURATION: 95 % | DIASTOLIC BLOOD PRESSURE: 55 MMHG

## 2021-11-08 PROCEDURE — 99214 OFFICE O/P EST MOD 30 MIN: CPT

## 2021-11-08 NOTE — HISTORY OF PRESENT ILLNESS
[FreeTextEntry1] : 76F with HTN, DM, HLD, CAD s/p multiple stents ([facility] - last 2013), syncope, who presents for follow up\par \par Has not had any syncopal episodes as of late\par Denies CP, dyspnea, lightheadedness- trying to focus more on eating better\par She stays in bed most of the day, doesn't get around much \par Was on a diuretic, now off due to UTI\par Seeing Dr. Alfaro\par \par HISTORY:\par \par 12/19- Pt had syncopal episode at [facility]. [place] it coming, felt lightheaded and next thing she knew she was on the floor.  She refused an ambulance.  \par Has had multiple syncopal and near syncopal episodes over the last few months. She notes she has had these symptoms for a long time. She does not eat or drink appropriately. Symptoms for at least a few years.\par Had a syncopal episode again a few weeks ago, while waiting in line at [facility]- hadn't eaten all day. \par \par Former smoker, (quit Jan 2019). Retired  \par \par ECG: SR with RBBB, nonspecific ST-T wave changes\par TTE 9/2020: Normal LV function, severe concentric LVH, MAC, mild MS, mild AS, mild LAE\par NST: EF 53%, moderate fixed defect anterior wall, small mild defect basal inferior, basal inferoseptal walls\par Carotid 2/2019: Bilateral <50% ICA stenosis, R ECA >50% stenosis \par Lipids: 183/38/120/141\par Holter: SR, rare PAC's, 9 beat run AT, rare PVC's\par \par Norvasc 10mg, atenolol 50mg BID, plavix 75mg, irbesartan 300mg, lipitor 40mg, terazosin 10mg

## 2021-11-08 NOTE — PHYSICAL EXAM

## 2021-12-02 ENCOUNTER — APPOINTMENT (OUTPATIENT)
Dept: INTERNAL MEDICINE | Facility: CLINIC | Age: 76
End: 2021-12-02

## 2022-01-11 RX ORDER — AMLODIPINE BESYLATE 10 MG/1
10 TABLET ORAL
Qty: 90 | Refills: 1 | Status: ACTIVE | COMMUNITY
Start: 2017-12-20 | End: 1900-01-01

## 2022-02-10 ENCOUNTER — APPOINTMENT (OUTPATIENT)
Dept: INTERNAL MEDICINE | Facility: CLINIC | Age: 77
End: 2022-02-10
Payer: MEDICARE

## 2022-02-10 ENCOUNTER — APPOINTMENT (OUTPATIENT)
Dept: CARDIOLOGY | Facility: CLINIC | Age: 77
End: 2022-02-10
Payer: MEDICARE

## 2022-02-10 VITALS
WEIGHT: 174 LBS | HEIGHT: 67 IN | SYSTOLIC BLOOD PRESSURE: 150 MMHG | HEART RATE: 64 BPM | DIASTOLIC BLOOD PRESSURE: 78 MMHG | BODY MASS INDEX: 27.31 KG/M2

## 2022-02-10 PROCEDURE — 99214 OFFICE O/P EST MOD 30 MIN: CPT

## 2022-02-10 PROCEDURE — 93306 TTE W/DOPPLER COMPLETE: CPT

## 2022-02-10 NOTE — PHYSICAL EXAM

## 2022-02-10 NOTE — DISCUSSION/SUMMARY
[FreeTextEntry1] : 76F with CAD, DM, HTN, HLD, carotid stenosis, who presents for follow up of\par \par Syncope: No recent episodes. She is on a lower dose of atenolol, off diuretic. \par \par Suspect combination of vasovagal, dehydration and lack of po intake, medications. Always with prodromal symptoms, usually while standing in line for a while, no evidence of AV block on ECG and echo within normal limits. Very labile BP readings.\par \par -Caution with standing for too long\par -Encourage po intake, hydration\par -Off all agents for DM-last a1c 6.1\par -Med compliance strongly stressed, appropriate sleep hygiene, eating\par \par CAD:  Cont Plavix monotherapy. Infarct without ischemia on recent stress. Nonobstructive carotid disease. Cont statin. Lipids checked with PCP\par \par HTN: BP is elevated but acceptable for today. Labile readings. BP fluctuates and possible orthostasis\par \par Mild MS/AS: Stable on echo today, serial echos\par \par RV 3 months\par Echo \par \par

## 2022-02-11 ENCOUNTER — RX RENEWAL (OUTPATIENT)
Age: 77
End: 2022-02-11

## 2022-05-12 ENCOUNTER — APPOINTMENT (OUTPATIENT)
Dept: CARDIOLOGY | Facility: CLINIC | Age: 77
End: 2022-05-12

## 2022-05-18 ENCOUNTER — APPOINTMENT (OUTPATIENT)
Dept: CARDIOLOGY | Facility: CLINIC | Age: 77
End: 2022-05-18
Payer: MEDICARE

## 2022-05-18 VITALS
OXYGEN SATURATION: 97 % | DIASTOLIC BLOOD PRESSURE: 64 MMHG | HEIGHT: 67 IN | WEIGHT: 168 LBS | TEMPERATURE: 98 F | SYSTOLIC BLOOD PRESSURE: 172 MMHG | HEART RATE: 64 BPM | BODY MASS INDEX: 26.37 KG/M2

## 2022-05-18 DIAGNOSIS — R42 DIZZINESS AND GIDDINESS: ICD-10-CM

## 2022-05-18 PROCEDURE — 99214 OFFICE O/P EST MOD 30 MIN: CPT

## 2022-05-18 NOTE — DISCUSSION/SUMMARY
[FreeTextEntry1] : 77F with CAD, DM, HTN, HLD, carotid stenosis, who presents for follow up of\par \par Syncope: No recent episodes. She is on a lower dose of atenolol, off diuretic. \par \par Suspect combination of vasovagal, dehydration and lack of po intake, medications. Always with prodromal symptoms, usually while standing in line for a while, no evidence of AV block on ECG and echo within normal limits. Very labile BP readings.\par \par -Caution with standing for too long\par -Encourage po intake, hydration\par -Off all agents for DM- has been well controlled \par -Med compliance strongly stressed, appropriate sleep hygiene, eating\par \par CAD:  Cont Plavix monotherapy. Infarct without ischemia on recent stress. Nonobstructive carotid disease. Cont statin. Lipids checked with PCP\par \par HTN: BP is elevated- she did not sleep well last night. Labile readings. BP fluctuates and possible orthostasis\par \par Mild MS/AS: Stable on echo today, serial echos\par \par RV 3M\par

## 2022-05-18 NOTE — HISTORY OF PRESENT ILLNESS
[FreeTextEntry1] : 77F with HTN, DM, HLD, CAD s/p multiple stents ([facility] - last 2013), syncope, who presents for follow up\par \par ER visit lately due to constipation- unclear story. She was kept under observation?\par Unchanged dyspnea with exertion- she is very sedentary, in bed all day \par No recent syncopal episodes\par Was on a diuretic, now off due to UTI\par Seeing Dr. Alfaro\par \par HISTORY:\par \par 12/19- Pt had syncopal episode at [facility]. [place] it coming, felt lightheaded and next thing she knew she was on the floor.  She refused an ambulance.  \par Has had multiple syncopal and near syncopal episodes over the last few months. She notes she has had these symptoms for a long time. She does not eat or drink appropriately. Symptoms for at least a few years.\par Had a syncopal episode again a few weeks ago, while waiting in line at [facility]- hadn't eaten all day. \par \par Former smoker, (quit Jan 2019). Retired  \par \par ECG: SR with RBBB, nonspecific ST-T wave changes\par TTE 2/2022: Normal LV function, severe concentric LVH, MAC, mild MS, mild AS, severe LAE\par NST: EF 53%, moderate fixed defect anterior wall, small mild defect basal inferior, basal inferoseptal walls\par Carotid 2/2019: Bilateral <50% ICA stenosis, R ECA >50% stenosis \par Lipids: 183/38/120/141\par Holter: SR, rare PAC's, 9 beat run AT, rare PVC's\par \par plavix 75mg\par lipitor 40mg\par \par Norvasc 10mg \par atenolol 50mg BID\par irbesartan 300mg\par Doxazosin 4mg

## 2022-05-18 NOTE — PHYSICAL EXAM

## 2022-07-01 ENCOUNTER — APPOINTMENT (OUTPATIENT)
Dept: CARDIOLOGY | Facility: CLINIC | Age: 77
End: 2022-07-01

## 2022-08-01 ENCOUNTER — APPOINTMENT (OUTPATIENT)
Dept: CARDIOLOGY | Facility: CLINIC | Age: 77
End: 2022-08-01

## 2022-08-01 VITALS
BODY MASS INDEX: 32.2 KG/M2 | TEMPERATURE: 98.3 F | DIASTOLIC BLOOD PRESSURE: 56 MMHG | SYSTOLIC BLOOD PRESSURE: 140 MMHG | HEART RATE: 60 BPM | WEIGHT: 164 LBS | HEIGHT: 60 IN | OXYGEN SATURATION: 98 %

## 2022-08-01 DIAGNOSIS — I10 ESSENTIAL (PRIMARY) HYPERTENSION: ICD-10-CM

## 2022-08-01 DIAGNOSIS — R55 SYNCOPE AND COLLAPSE: ICD-10-CM

## 2022-08-01 DIAGNOSIS — E78.00 PURE HYPERCHOLESTEROLEMIA, UNSPECIFIED: ICD-10-CM

## 2022-08-01 DIAGNOSIS — I25.10 ATHEROSCLEROTIC HEART DISEASE OF NATIVE CORONARY ARTERY W/OUT ANGINA PECTORIS: ICD-10-CM

## 2022-08-01 PROCEDURE — 99214 OFFICE O/P EST MOD 30 MIN: CPT

## 2022-08-01 NOTE — DISCUSSION/SUMMARY
[FreeTextEntry1] : 77F with CAD, DM, HTN, HLD, carotid stenosis, who presents for follow up of\par \par Syncope: No recent episodes. She is on a lower dose of atenolol, off diuretic. \par \par Suspect combination of vasovagal, dehydration and lack of po intake, medications. Always with prodromal symptoms, usually while standing in line for a while, no evidence of AV block on ECG and echo within normal limits. Very labile BP readings.\par \par -Caution with standing for too long\par -Encourage po intake, hydration\par -Off all agents for DM- has been well controlled \par -Med compliance strongly stressed, appropriate sleep hygiene, eating\par \par CAD:  Cont Plavix monotherapy. Infarct without ischemia on recent stress. Nonobstructive carotid disease. Cont statin. Lipids checked with PCP. No symptoms\par \par HTN: BP OK today. BP fluctuates and possible orthostasis\par \par Mild MS/AS: Stable on echo, serial echos\par \par RV 3M\par Blood work with PCP \par

## 2022-08-01 NOTE — PHYSICAL EXAM

## 2022-08-01 NOTE — HISTORY OF PRESENT ILLNESS
[FreeTextEntry1] : 77F with HTN, DM, HLD, CAD s/p multiple stents ([facility] - last 2013), syncope, who presents for follow up\par \par No further syncopal episodes\par No real dyspnea but also does not get out much\par Has lost close to 30 lbs in the last 2 years- does not eat much \par On and off Diuretic\par Seeing Dr. Alfaro\par \par HISTORY:\par \par 12/19- Pt had syncopal episode at [facility]. [place] it coming, felt lightheaded and next thing she knew she was on the floor.  She refused an ambulance.  \par Has had multiple syncopal and near syncopal episodes over the last few months. She notes she has had these symptoms for a long time. She does not eat or drink appropriately. Symptoms for at least a few years.\par Had a syncopal episode again a few weeks ago, while waiting in line at [facility]- hadn't eaten all day. \par \par Former smoker, (quit Jan 2019). Retired  \par \par ECG: SR with RBBB, nonspecific ST-T wave changes\par TTE 2/2022: Normal LV function, severe concentric LVH, MAC, mild MS, mild AS, severe LAE\par NST: EF 53%, moderate fixed defect anterior wall, small mild defect basal inferior, basal inferoseptal walls\par Carotid 2/2019: Bilateral <50% ICA stenosis, R ECA >50% stenosis \par Holter: SR, rare PAC's, 9 beat run AT, rare PVC's\par \par plavix 75mg\par lipitor 40mg\par \par Norvasc 10mg \par atenolol 50mg BID\par irbesartan 300mg\par Doxazosin 4mg

## 2022-10-10 ENCOUNTER — NON-APPOINTMENT (OUTPATIENT)
Age: 77
End: 2022-10-10

## 2022-11-07 ENCOUNTER — APPOINTMENT (OUTPATIENT)
Dept: CARDIOLOGY | Facility: CLINIC | Age: 77
End: 2022-11-07

## 2022-11-09 ENCOUNTER — NON-APPOINTMENT (OUTPATIENT)
Age: 77
End: 2022-11-09

## 2023-11-30 NOTE — DISCUSSION/SUMMARY
[FreeTextEntry1] : 76F with CAD, DM, HTN, HLD, carotid stenosis, who presents for follow up of\par \par 1. Syncope: No recent episodes. She is on a lower dose of atenolol, off diuretic. \par \par Suspect combination of vasovagal, dehydration and lack of po intake, medications. Always with prodromal symptoms, usually while standing in line for a while, no evidence of AV block on ECG and echo within normal limits. Very labile BP readings.\par \par -Caution with standing for too long\par -Encourage po intake, hydration\par -Off all agents for DM-last a1c 6.1\par -Med compliance strongly stressed, appropriate sleep hygiene, eating\par \par 2. CAD:  Cont Plavix monotherapy. Infarct without ischemia on recent stress. Nonobstructive carotid disease. Cont statin. Lipids borderline\par \par 3. HTN: BP is borderline but better than what it has been on prior visits. BP fluctuates and possible orthostasis. Got a 1 time reading manually of 180/80. Continue current medications at this time . \par \par 4. Mild MS/AS: Serial echos. Last one > 1 year ago, would set up for repeat \par \par RV 3 months\par Echo \par \par 
100

## 2023-12-10 ENCOUNTER — INPATIENT (INPATIENT)
Facility: HOSPITAL | Age: 78
LOS: 3 days | Discharge: HOME HEALTH | End: 2023-12-14
Attending: INTERNAL MEDICINE | Admitting: INTERNAL MEDICINE
Payer: MEDICARE

## 2023-12-10 VITALS
OXYGEN SATURATION: 99 % | SYSTOLIC BLOOD PRESSURE: 151 MMHG | DIASTOLIC BLOOD PRESSURE: 56 MMHG | WEIGHT: 130.07 LBS | HEIGHT: 68 IN | RESPIRATION RATE: 19 BRPM | TEMPERATURE: 98 F | HEART RATE: 78 BPM

## 2023-12-10 DIAGNOSIS — K57.32 DIVERTICULITIS OF LARGE INTESTINE WITHOUT PERFORATION OR ABSCESS WITHOUT BLEEDING: ICD-10-CM

## 2023-12-10 DIAGNOSIS — I10 ESSENTIAL (PRIMARY) HYPERTENSION: ICD-10-CM

## 2023-12-10 DIAGNOSIS — K52.9 NONINFECTIVE GASTROENTERITIS AND COLITIS, UNSPECIFIED: ICD-10-CM

## 2023-12-10 DIAGNOSIS — Z85.038 PERSONAL HISTORY OF OTHER MALIGNANT NEOPLASM OF LARGE INTESTINE: ICD-10-CM

## 2023-12-10 DIAGNOSIS — D64.9 ANEMIA, UNSPECIFIED: ICD-10-CM

## 2023-12-10 DIAGNOSIS — I25.2 OLD MYOCARDIAL INFARCTION: ICD-10-CM

## 2023-12-10 DIAGNOSIS — N28.1 CYST OF KIDNEY, ACQUIRED: ICD-10-CM

## 2023-12-10 DIAGNOSIS — E78.5 HYPERLIPIDEMIA, UNSPECIFIED: ICD-10-CM

## 2023-12-10 DIAGNOSIS — I25.10 ATHEROSCLEROTIC HEART DISEASE OF NATIVE CORONARY ARTERY WITHOUT ANGINA PECTORIS: ICD-10-CM

## 2023-12-10 DIAGNOSIS — Z66 DO NOT RESUSCITATE: ICD-10-CM

## 2023-12-10 DIAGNOSIS — N17.9 ACUTE KIDNEY FAILURE, UNSPECIFIED: ICD-10-CM

## 2023-12-10 DIAGNOSIS — Z95.5 PRESENCE OF CORONARY ANGIOPLASTY IMPLANT AND GRAFT: ICD-10-CM

## 2023-12-10 LAB
ALBUMIN SERPL ELPH-MCNC: 2.9 G/DL — LOW (ref 3.3–5)
ALBUMIN SERPL ELPH-MCNC: 2.9 G/DL — LOW (ref 3.3–5)
ALP SERPL-CCNC: 82 U/L — SIGNIFICANT CHANGE UP (ref 40–120)
ALP SERPL-CCNC: 82 U/L — SIGNIFICANT CHANGE UP (ref 40–120)
ALT FLD-CCNC: 10 U/L — LOW (ref 12–78)
ALT FLD-CCNC: 10 U/L — LOW (ref 12–78)
ANION GAP SERPL CALC-SCNC: 11 MMOL/L — SIGNIFICANT CHANGE UP (ref 5–17)
ANION GAP SERPL CALC-SCNC: 11 MMOL/L — SIGNIFICANT CHANGE UP (ref 5–17)
AST SERPL-CCNC: 15 U/L — SIGNIFICANT CHANGE UP (ref 15–37)
AST SERPL-CCNC: 15 U/L — SIGNIFICANT CHANGE UP (ref 15–37)
BASOPHILS # BLD AUTO: 0.02 K/UL — SIGNIFICANT CHANGE UP (ref 0–0.2)
BASOPHILS # BLD AUTO: 0.02 K/UL — SIGNIFICANT CHANGE UP (ref 0–0.2)
BASOPHILS NFR BLD AUTO: 0.3 % — SIGNIFICANT CHANGE UP (ref 0–2)
BASOPHILS NFR BLD AUTO: 0.3 % — SIGNIFICANT CHANGE UP (ref 0–2)
BILIRUB SERPL-MCNC: 0.3 MG/DL — SIGNIFICANT CHANGE UP (ref 0.2–1.2)
BILIRUB SERPL-MCNC: 0.3 MG/DL — SIGNIFICANT CHANGE UP (ref 0.2–1.2)
BUN SERPL-MCNC: 86 MG/DL — HIGH (ref 7–23)
BUN SERPL-MCNC: 86 MG/DL — HIGH (ref 7–23)
BURR CELLS BLD QL SMEAR: PRESENT — SIGNIFICANT CHANGE UP
BURR CELLS BLD QL SMEAR: PRESENT — SIGNIFICANT CHANGE UP
CALCIUM SERPL-MCNC: 8.7 MG/DL — SIGNIFICANT CHANGE UP (ref 8.5–10.1)
CALCIUM SERPL-MCNC: 8.7 MG/DL — SIGNIFICANT CHANGE UP (ref 8.5–10.1)
CHLORIDE SERPL-SCNC: 108 MMOL/L — SIGNIFICANT CHANGE UP (ref 96–108)
CHLORIDE SERPL-SCNC: 108 MMOL/L — SIGNIFICANT CHANGE UP (ref 96–108)
CO2 SERPL-SCNC: 18 MMOL/L — LOW (ref 22–31)
CO2 SERPL-SCNC: 18 MMOL/L — LOW (ref 22–31)
CREAT SERPL-MCNC: 3.04 MG/DL — HIGH (ref 0.5–1.3)
CREAT SERPL-MCNC: 3.04 MG/DL — HIGH (ref 0.5–1.3)
EGFR: 15 ML/MIN/1.73M2 — LOW
EGFR: 15 ML/MIN/1.73M2 — LOW
EOSINOPHIL # BLD AUTO: 0.01 K/UL — SIGNIFICANT CHANGE UP (ref 0–0.5)
EOSINOPHIL # BLD AUTO: 0.01 K/UL — SIGNIFICANT CHANGE UP (ref 0–0.5)
EOSINOPHIL NFR BLD AUTO: 0.2 % — SIGNIFICANT CHANGE UP (ref 0–6)
EOSINOPHIL NFR BLD AUTO: 0.2 % — SIGNIFICANT CHANGE UP (ref 0–6)
FLUAV AG NPH QL: SIGNIFICANT CHANGE UP
FLUAV AG NPH QL: SIGNIFICANT CHANGE UP
FLUBV AG NPH QL: SIGNIFICANT CHANGE UP
FLUBV AG NPH QL: SIGNIFICANT CHANGE UP
GLUCOSE SERPL-MCNC: 159 MG/DL — HIGH (ref 70–99)
GLUCOSE SERPL-MCNC: 159 MG/DL — HIGH (ref 70–99)
HCT VFR BLD CALC: 25 % — LOW (ref 34.5–45)
HCT VFR BLD CALC: 25 % — LOW (ref 34.5–45)
HGB BLD-MCNC: 7.9 G/DL — LOW (ref 11.5–15.5)
HGB BLD-MCNC: 7.9 G/DL — LOW (ref 11.5–15.5)
IMM GRANULOCYTES NFR BLD AUTO: 0.3 % — SIGNIFICANT CHANGE UP (ref 0–0.9)
IMM GRANULOCYTES NFR BLD AUTO: 0.3 % — SIGNIFICANT CHANGE UP (ref 0–0.9)
LIDOCAIN IGE QN: 106 U/L — HIGH (ref 13–75)
LIDOCAIN IGE QN: 106 U/L — HIGH (ref 13–75)
LYMPHOCYTES # BLD AUTO: 0.69 K/UL — LOW (ref 1–3.3)
LYMPHOCYTES # BLD AUTO: 0.69 K/UL — LOW (ref 1–3.3)
LYMPHOCYTES # BLD AUTO: 11 % — LOW (ref 13–44)
LYMPHOCYTES # BLD AUTO: 11 % — LOW (ref 13–44)
MAGNESIUM SERPL-MCNC: 2.5 MG/DL — SIGNIFICANT CHANGE UP (ref 1.6–2.6)
MAGNESIUM SERPL-MCNC: 2.5 MG/DL — SIGNIFICANT CHANGE UP (ref 1.6–2.6)
MANUAL SMEAR VERIFICATION: SIGNIFICANT CHANGE UP
MANUAL SMEAR VERIFICATION: SIGNIFICANT CHANGE UP
MCHC RBC-ENTMCNC: 29.2 PG — SIGNIFICANT CHANGE UP (ref 27–34)
MCHC RBC-ENTMCNC: 29.2 PG — SIGNIFICANT CHANGE UP (ref 27–34)
MCHC RBC-ENTMCNC: 31.6 G/DL — LOW (ref 32–36)
MCHC RBC-ENTMCNC: 31.6 G/DL — LOW (ref 32–36)
MCV RBC AUTO: 92.3 FL — SIGNIFICANT CHANGE UP (ref 80–100)
MCV RBC AUTO: 92.3 FL — SIGNIFICANT CHANGE UP (ref 80–100)
MONOCYTES # BLD AUTO: 0.46 K/UL — SIGNIFICANT CHANGE UP (ref 0–0.9)
MONOCYTES # BLD AUTO: 0.46 K/UL — SIGNIFICANT CHANGE UP (ref 0–0.9)
MONOCYTES NFR BLD AUTO: 7.3 % — SIGNIFICANT CHANGE UP (ref 2–14)
MONOCYTES NFR BLD AUTO: 7.3 % — SIGNIFICANT CHANGE UP (ref 2–14)
NEUTROPHILS # BLD AUTO: 5.09 K/UL — SIGNIFICANT CHANGE UP (ref 1.8–7.4)
NEUTROPHILS # BLD AUTO: 5.09 K/UL — SIGNIFICANT CHANGE UP (ref 1.8–7.4)
NEUTROPHILS NFR BLD AUTO: 80.9 % — HIGH (ref 43–77)
NEUTROPHILS NFR BLD AUTO: 80.9 % — HIGH (ref 43–77)
NRBC # BLD: 0 /100 WBCS — SIGNIFICANT CHANGE UP (ref 0–0)
NRBC # BLD: 0 /100 WBCS — SIGNIFICANT CHANGE UP (ref 0–0)
NT-PROBNP SERPL-SCNC: 4112 PG/ML — HIGH (ref 0–450)
NT-PROBNP SERPL-SCNC: 4112 PG/ML — HIGH (ref 0–450)
OVALOCYTES BLD QL SMEAR: SLIGHT — SIGNIFICANT CHANGE UP
OVALOCYTES BLD QL SMEAR: SLIGHT — SIGNIFICANT CHANGE UP
PLAT MORPH BLD: NORMAL — SIGNIFICANT CHANGE UP
PLAT MORPH BLD: NORMAL — SIGNIFICANT CHANGE UP
PLATELET # BLD AUTO: 108 K/UL — LOW (ref 150–400)
PLATELET # BLD AUTO: 108 K/UL — LOW (ref 150–400)
POTASSIUM SERPL-MCNC: 4.3 MMOL/L — SIGNIFICANT CHANGE UP (ref 3.5–5.3)
POTASSIUM SERPL-MCNC: 4.3 MMOL/L — SIGNIFICANT CHANGE UP (ref 3.5–5.3)
POTASSIUM SERPL-SCNC: 4.3 MMOL/L — SIGNIFICANT CHANGE UP (ref 3.5–5.3)
POTASSIUM SERPL-SCNC: 4.3 MMOL/L — SIGNIFICANT CHANGE UP (ref 3.5–5.3)
PROT SERPL-MCNC: 6.9 GM/DL — SIGNIFICANT CHANGE UP (ref 6–8.3)
PROT SERPL-MCNC: 6.9 GM/DL — SIGNIFICANT CHANGE UP (ref 6–8.3)
RBC # BLD: 2.71 M/UL — LOW (ref 3.8–5.2)
RBC # BLD: 2.71 M/UL — LOW (ref 3.8–5.2)
RBC # FLD: 13.7 % — SIGNIFICANT CHANGE UP (ref 10.3–14.5)
RBC # FLD: 13.7 % — SIGNIFICANT CHANGE UP (ref 10.3–14.5)
RBC BLD AUTO: ABNORMAL
RBC BLD AUTO: ABNORMAL
ROULEAUX BLD QL SMEAR: PRESENT
ROULEAUX BLD QL SMEAR: PRESENT
SARS-COV-2 RNA SPEC QL NAA+PROBE: SIGNIFICANT CHANGE UP
SARS-COV-2 RNA SPEC QL NAA+PROBE: SIGNIFICANT CHANGE UP
SODIUM SERPL-SCNC: 137 MMOL/L — SIGNIFICANT CHANGE UP (ref 135–145)
SODIUM SERPL-SCNC: 137 MMOL/L — SIGNIFICANT CHANGE UP (ref 135–145)
TROPONIN I, HIGH SENSITIVITY RESULT: 26.7 NG/L — SIGNIFICANT CHANGE UP
TROPONIN I, HIGH SENSITIVITY RESULT: 26.7 NG/L — SIGNIFICANT CHANGE UP
WBC # BLD: 6.29 K/UL — SIGNIFICANT CHANGE UP (ref 3.8–10.5)
WBC # BLD: 6.29 K/UL — SIGNIFICANT CHANGE UP (ref 3.8–10.5)
WBC # FLD AUTO: 6.29 K/UL — SIGNIFICANT CHANGE UP (ref 3.8–10.5)
WBC # FLD AUTO: 6.29 K/UL — SIGNIFICANT CHANGE UP (ref 3.8–10.5)

## 2023-12-10 PROCEDURE — 93010 ELECTROCARDIOGRAM REPORT: CPT

## 2023-12-10 PROCEDURE — 99223 1ST HOSP IP/OBS HIGH 75: CPT

## 2023-12-10 PROCEDURE — 99285 EMERGENCY DEPT VISIT HI MDM: CPT

## 2023-12-10 PROCEDURE — 71045 X-RAY EXAM CHEST 1 VIEW: CPT | Mod: 26

## 2023-12-10 PROCEDURE — 74176 CT ABD & PELVIS W/O CONTRAST: CPT | Mod: 26,MA

## 2023-12-10 RX ORDER — ACETAMINOPHEN 500 MG
650 TABLET ORAL ONCE
Refills: 0 | Status: COMPLETED | OUTPATIENT
Start: 2023-12-10 | End: 2023-12-10

## 2023-12-10 RX ORDER — METRONIDAZOLE 500 MG
500 TABLET ORAL ONCE
Refills: 0 | Status: COMPLETED | OUTPATIENT
Start: 2023-12-10 | End: 2023-12-10

## 2023-12-10 RX ORDER — CIPROFLOXACIN LACTATE 400MG/40ML
400 VIAL (ML) INTRAVENOUS ONCE
Refills: 0 | Status: COMPLETED | OUTPATIENT
Start: 2023-12-10 | End: 2023-12-10

## 2023-12-10 RX ADMIN — Medication 650 MILLIGRAM(S): at 18:49

## 2023-12-10 RX ADMIN — Medication 200 MILLIGRAM(S): at 20:13

## 2023-12-10 RX ADMIN — Medication 100 MILLIGRAM(S): at 21:09

## 2023-12-10 RX ADMIN — Medication 400 MILLIGRAM(S): at 21:09

## 2023-12-10 NOTE — ED ADULT NURSE NOTE - NSFALLUNIVINTERV_ED_ALL_ED
Bed/Stretcher in lowest position, wheels locked, appropriate side rails in place/Call bell, personal items and telephone in reach/Instruct patient to call for assistance before getting out of bed/chair/stretcher/Non-slip footwear applied when patient is off stretcher/[place] to call system/Physically safe environment - no spills, clutter or unnecessary equipment/Purposeful proactive rounding/Room/bathroom lighting operational, light cord in reach Bed/Stretcher in lowest position, wheels locked, appropriate side rails in place/Call bell, personal items and telephone in reach/Instruct patient to call for assistance before getting out of bed/chair/stretcher/Non-slip footwear applied when patient is off stretcher/[place] to call system/Physically safe environment - no spills, clutter or unnecessary equipment/Purposeful proactive rounding/Room/bathroom lighting operational, light cord in reach

## 2023-12-10 NOTE — ED PROVIDER NOTE - IV ALTEPLASE EXCL ABS HIDDEN
Chronic, managed by orthopedics  - continue current regime per orthopedics  - f/u with orthopedics as scheduled show

## 2023-12-10 NOTE — PATIENT PROFILE ADULT - FALL HARM RISK - HARM RISK INTERVENTIONS
Assistance with ambulation/Assistance OOB with selected safe patient handling equipment/Communicate Risk of Fall with Harm to all staff/Discuss with provider need for PT consult/Monitor gait and stability/Provide patient with walking aids - walker, cane, crutches/Reinforce activity limits and safety measures with patient and family/Tailored Fall Risk Interventions/Visual Cue: Yellow wristband and red socks/Bed in lowest position, wheels locked, appropriate side rails in place/Call bell, personal items and telephone in reach/Instruct patient to call for assistance before getting out of bed or chair/Non-slip footwear when patient is out of bed/[place] to call system/Physically safe environment - no spills, clutter or unnecessary equipment/Purposeful Proactive Rounding/Room/bathroom lighting operational, light cord in reach Assistance with ambulation/Assistance OOB with selected safe patient handling equipment/Communicate Risk of Fall with Harm to all staff/Discuss with provider need for PT consult/Monitor gait and stability/Provide patient with walking aids - walker, cane, crutches/Reinforce activity limits and safety measures with patient and family/Tailored Fall Risk Interventions/Visual Cue: Yellow wristband and red socks/Bed in lowest position, wheels locked, appropriate side rails in place/Call bell, personal items and telephone in reach/Instruct patient to call for assistance before getting out of bed or chair/Non-slip footwear when patient is out of bed/[place] to call system/Physically safe environment - no spills, clutter or unnecessary equipment/Purposeful Proactive Rounding/Room/bathroom lighting operational, light cord in reach

## 2023-12-10 NOTE — ED PROVIDER NOTE - OBJECTIVE STATEMENT
78-year-old female with PMH colon cancer in remission, MI s/p PCI with stent, HTN, HLD presents with diffuse body pain x today.  ambulates with a cane at baseline and has been able to do that today.  no sob, vomiting, diarrhea, fever, cough, congestion, urinary sxs. 78-year-old female with PMH colon cancer in remission, MI s/p PCI with stent, HTN, HLD presents with diffuse body pain/weakness x today.  ambulates with a cane at baseline and has been able to do that today.  no sob, vomiting, diarrhea, fever, cough, congestion, urinary sxs.

## 2023-12-10 NOTE — ED PROVIDER NOTE - CARE PLAN
Principal Discharge DX:	Diverticulitis  Secondary Diagnosis:	Weakness  Secondary Diagnosis:	Anemia  Secondary Diagnosis:	SHAYLEE (acute kidney injury)  Secondary Diagnosis:	Colitis   1

## 2023-12-10 NOTE — ED ADULT NURSE NOTE - OBJECTIVE STATEMENT
pt a&O X4 FORM HOME C.O OF " MY WHOLE BODY HURTS". PT SATES AWOKE LIKE THIS TODAY SAYS INTERMITTENT EPISODES BUT THIS ONE  is on GOING HOURS. pt denies fall injures. hx colon cancer currently under going oral tx unknown med name , poor historian

## 2023-12-10 NOTE — ED PROVIDER NOTE - PHYSICAL EXAMINATION
PHYSICAL EXAM:    GENERAL: Alert, appears stated age, well appearing, non-toxic  SKIN: Warm, and dry. MMM.   HEAD: NC, AT  EYE: Normal lids/conjunctiva, PERRL, EOMI  ENT: Normal hearing, patent oropharynx   NECK: +supple. No meningismus, or JVD, +Trachea midline. no tenderness/step offs.   Pulm: Bilateral BS, normal resp effort, no wheezes, stridor, or retractions  CV: RRR, no M/R/G, 2+and = radial pulses  Abd: soft, non-tender, non-distended. no rebound/guarding. no CVA tenderness.   Mskel: no erythema, cyanosis, edema. no calf tenderness  Neuro: AAOx3, moving all extremities.

## 2023-12-10 NOTE — ED ADULT NURSE NOTE - ED STAT RN HANDOFF DETAILS 2
report given to Nilton Ye, ED hold nurse.  pt AAOx4, ambulates on own, uses cane when she is out.  #20guage angio L-forearm

## 2023-12-10 NOTE — ED PROVIDER NOTE - ATTENDING APP SHARED VISIT CONTRIBUTION OF CARE
I performed a history and physical examination of the patient and discussed his management with the PA  I reviewed the PA's note and agree with the documented findings and plan of care.

## 2023-12-11 DIAGNOSIS — K57.92 DIVERTICULITIS OF INTESTINE, PART UNSPECIFIED, WITHOUT PERFORATION OR ABSCESS WITHOUT BLEEDING: ICD-10-CM

## 2023-12-11 DIAGNOSIS — I10 ESSENTIAL (PRIMARY) HYPERTENSION: ICD-10-CM

## 2023-12-11 DIAGNOSIS — K52.9 NONINFECTIVE GASTROENTERITIS AND COLITIS, UNSPECIFIED: ICD-10-CM

## 2023-12-11 LAB
A1C WITH ESTIMATED AVERAGE GLUCOSE RESULT: 5.3 % — SIGNIFICANT CHANGE UP (ref 4–5.6)
A1C WITH ESTIMATED AVERAGE GLUCOSE RESULT: 5.3 % — SIGNIFICANT CHANGE UP (ref 4–5.6)
ALBUMIN SERPL ELPH-MCNC: 2.6 G/DL — LOW (ref 3.3–5)
ALBUMIN SERPL ELPH-MCNC: 2.6 G/DL — LOW (ref 3.3–5)
ALP SERPL-CCNC: 73 U/L — SIGNIFICANT CHANGE UP (ref 40–120)
ALP SERPL-CCNC: 73 U/L — SIGNIFICANT CHANGE UP (ref 40–120)
ALT FLD-CCNC: 8 U/L — LOW (ref 12–78)
ALT FLD-CCNC: 8 U/L — LOW (ref 12–78)
ANION GAP SERPL CALC-SCNC: 10 MMOL/L — SIGNIFICANT CHANGE UP (ref 5–17)
ANION GAP SERPL CALC-SCNC: 10 MMOL/L — SIGNIFICANT CHANGE UP (ref 5–17)
APPEARANCE UR: CLEAR — SIGNIFICANT CHANGE UP
APPEARANCE UR: CLEAR — SIGNIFICANT CHANGE UP
AST SERPL-CCNC: 13 U/L — LOW (ref 15–37)
AST SERPL-CCNC: 13 U/L — LOW (ref 15–37)
BACTERIA # UR AUTO: ABNORMAL /HPF
BACTERIA # UR AUTO: ABNORMAL /HPF
BILIRUB SERPL-MCNC: 0.3 MG/DL — SIGNIFICANT CHANGE UP (ref 0.2–1.2)
BILIRUB SERPL-MCNC: 0.3 MG/DL — SIGNIFICANT CHANGE UP (ref 0.2–1.2)
BILIRUB UR-MCNC: NEGATIVE — SIGNIFICANT CHANGE UP
BILIRUB UR-MCNC: NEGATIVE — SIGNIFICANT CHANGE UP
BUN SERPL-MCNC: 89 MG/DL — HIGH (ref 7–23)
BUN SERPL-MCNC: 89 MG/DL — HIGH (ref 7–23)
CALCIUM SERPL-MCNC: 8.7 MG/DL — SIGNIFICANT CHANGE UP (ref 8.5–10.1)
CALCIUM SERPL-MCNC: 8.7 MG/DL — SIGNIFICANT CHANGE UP (ref 8.5–10.1)
CHLORIDE SERPL-SCNC: 110 MMOL/L — HIGH (ref 96–108)
CHLORIDE SERPL-SCNC: 110 MMOL/L — HIGH (ref 96–108)
CHOLEST SERPL-MCNC: 117 MG/DL — SIGNIFICANT CHANGE UP
CHOLEST SERPL-MCNC: 117 MG/DL — SIGNIFICANT CHANGE UP
CO2 SERPL-SCNC: 17 MMOL/L — LOW (ref 22–31)
CO2 SERPL-SCNC: 17 MMOL/L — LOW (ref 22–31)
COLOR SPEC: YELLOW — SIGNIFICANT CHANGE UP
COLOR SPEC: YELLOW — SIGNIFICANT CHANGE UP
CREAT SERPL-MCNC: 2.95 MG/DL — HIGH (ref 0.5–1.3)
CREAT SERPL-MCNC: 2.95 MG/DL — HIGH (ref 0.5–1.3)
DIFF PNL FLD: NEGATIVE — SIGNIFICANT CHANGE UP
DIFF PNL FLD: NEGATIVE — SIGNIFICANT CHANGE UP
EGFR: 16 ML/MIN/1.73M2 — LOW
EGFR: 16 ML/MIN/1.73M2 — LOW
EPI CELLS # UR: PRESENT
EPI CELLS # UR: PRESENT
ESTIMATED AVERAGE GLUCOSE: 105 MG/DL — SIGNIFICANT CHANGE UP (ref 68–114)
ESTIMATED AVERAGE GLUCOSE: 105 MG/DL — SIGNIFICANT CHANGE UP (ref 68–114)
GLUCOSE SERPL-MCNC: 96 MG/DL — SIGNIFICANT CHANGE UP (ref 70–99)
GLUCOSE SERPL-MCNC: 96 MG/DL — SIGNIFICANT CHANGE UP (ref 70–99)
GLUCOSE UR QL: NEGATIVE MG/DL — SIGNIFICANT CHANGE UP
GLUCOSE UR QL: NEGATIVE MG/DL — SIGNIFICANT CHANGE UP
HCT VFR BLD CALC: 23.3 % — LOW (ref 34.5–45)
HCT VFR BLD CALC: 23.3 % — LOW (ref 34.5–45)
HDLC SERPL-MCNC: 22 MG/DL — LOW
HDLC SERPL-MCNC: 22 MG/DL — LOW
HGB BLD-MCNC: 7.4 G/DL — LOW (ref 11.5–15.5)
HGB BLD-MCNC: 7.4 G/DL — LOW (ref 11.5–15.5)
KETONES UR-MCNC: NEGATIVE MG/DL — SIGNIFICANT CHANGE UP
KETONES UR-MCNC: NEGATIVE MG/DL — SIGNIFICANT CHANGE UP
LEUKOCYTE ESTERASE UR-ACNC: ABNORMAL
LEUKOCYTE ESTERASE UR-ACNC: ABNORMAL
LIPID PNL WITH DIRECT LDL SERPL: 68 MG/DL — SIGNIFICANT CHANGE UP
LIPID PNL WITH DIRECT LDL SERPL: 68 MG/DL — SIGNIFICANT CHANGE UP
MCHC RBC-ENTMCNC: 29.2 PG — SIGNIFICANT CHANGE UP (ref 27–34)
MCHC RBC-ENTMCNC: 29.2 PG — SIGNIFICANT CHANGE UP (ref 27–34)
MCHC RBC-ENTMCNC: 31.8 G/DL — LOW (ref 32–36)
MCHC RBC-ENTMCNC: 31.8 G/DL — LOW (ref 32–36)
MCV RBC AUTO: 92.1 FL — SIGNIFICANT CHANGE UP (ref 80–100)
MCV RBC AUTO: 92.1 FL — SIGNIFICANT CHANGE UP (ref 80–100)
NITRITE UR-MCNC: NEGATIVE — SIGNIFICANT CHANGE UP
NITRITE UR-MCNC: NEGATIVE — SIGNIFICANT CHANGE UP
NON HDL CHOLESTEROL: 95 MG/DL — SIGNIFICANT CHANGE UP
NON HDL CHOLESTEROL: 95 MG/DL — SIGNIFICANT CHANGE UP
NRBC # BLD: 0 /100 WBCS — SIGNIFICANT CHANGE UP (ref 0–0)
NRBC # BLD: 0 /100 WBCS — SIGNIFICANT CHANGE UP (ref 0–0)
PH UR: 5 — SIGNIFICANT CHANGE UP (ref 5–8)
PH UR: 5 — SIGNIFICANT CHANGE UP (ref 5–8)
PLATELET # BLD AUTO: 107 K/UL — LOW (ref 150–400)
PLATELET # BLD AUTO: 107 K/UL — LOW (ref 150–400)
POTASSIUM SERPL-MCNC: 4.1 MMOL/L — SIGNIFICANT CHANGE UP (ref 3.5–5.3)
POTASSIUM SERPL-MCNC: 4.1 MMOL/L — SIGNIFICANT CHANGE UP (ref 3.5–5.3)
POTASSIUM SERPL-SCNC: 4.1 MMOL/L — SIGNIFICANT CHANGE UP (ref 3.5–5.3)
POTASSIUM SERPL-SCNC: 4.1 MMOL/L — SIGNIFICANT CHANGE UP (ref 3.5–5.3)
PROT SERPL-MCNC: 6.3 GM/DL — SIGNIFICANT CHANGE UP (ref 6–8.3)
PROT SERPL-MCNC: 6.3 GM/DL — SIGNIFICANT CHANGE UP (ref 6–8.3)
PROT UR-MCNC: SIGNIFICANT CHANGE UP MG/DL
PROT UR-MCNC: SIGNIFICANT CHANGE UP MG/DL
RBC # BLD: 2.53 M/UL — LOW (ref 3.8–5.2)
RBC # BLD: 2.53 M/UL — LOW (ref 3.8–5.2)
RBC # FLD: 13.8 % — SIGNIFICANT CHANGE UP (ref 10.3–14.5)
RBC # FLD: 13.8 % — SIGNIFICANT CHANGE UP (ref 10.3–14.5)
RBC CASTS # UR COMP ASSIST: SIGNIFICANT CHANGE UP /HPF (ref 0–4)
RBC CASTS # UR COMP ASSIST: SIGNIFICANT CHANGE UP /HPF (ref 0–4)
SODIUM SERPL-SCNC: 137 MMOL/L — SIGNIFICANT CHANGE UP (ref 135–145)
SODIUM SERPL-SCNC: 137 MMOL/L — SIGNIFICANT CHANGE UP (ref 135–145)
SP GR SPEC: 1.01 — SIGNIFICANT CHANGE UP (ref 1–1.03)
SP GR SPEC: 1.01 — SIGNIFICANT CHANGE UP (ref 1–1.03)
TRIGL SERPL-MCNC: 156 MG/DL — HIGH
TRIGL SERPL-MCNC: 156 MG/DL — HIGH
UROBILINOGEN FLD QL: 0.2 MG/DL — SIGNIFICANT CHANGE UP (ref 0.2–1)
UROBILINOGEN FLD QL: 0.2 MG/DL — SIGNIFICANT CHANGE UP (ref 0.2–1)
WBC # BLD: 5.68 K/UL — SIGNIFICANT CHANGE UP (ref 3.8–10.5)
WBC # BLD: 5.68 K/UL — SIGNIFICANT CHANGE UP (ref 3.8–10.5)
WBC # FLD AUTO: 5.68 K/UL — SIGNIFICANT CHANGE UP (ref 3.8–10.5)
WBC # FLD AUTO: 5.68 K/UL — SIGNIFICANT CHANGE UP (ref 3.8–10.5)
WBC UR QL: SIGNIFICANT CHANGE UP /HPF (ref 0–5)
WBC UR QL: SIGNIFICANT CHANGE UP /HPF (ref 0–5)

## 2023-12-11 PROCEDURE — 76775 US EXAM ABDO BACK WALL LIM: CPT | Mod: 26

## 2023-12-11 RX ORDER — LANOLIN ALCOHOL/MO/W.PET/CERES
3 CREAM (GRAM) TOPICAL AT BEDTIME
Refills: 0 | Status: DISCONTINUED | OUTPATIENT
Start: 2023-12-11 | End: 2023-12-14

## 2023-12-11 RX ORDER — METRONIDAZOLE 500 MG
500 TABLET ORAL EVERY 8 HOURS
Refills: 0 | Status: DISCONTINUED | OUTPATIENT
Start: 2023-12-11 | End: 2023-12-14

## 2023-12-11 RX ORDER — HEPARIN SODIUM 5000 [USP'U]/ML
5000 INJECTION INTRAVENOUS; SUBCUTANEOUS EVERY 8 HOURS
Refills: 0 | Status: DISCONTINUED | OUTPATIENT
Start: 2023-12-11 | End: 2023-12-14

## 2023-12-11 RX ORDER — PANTOPRAZOLE SODIUM 20 MG/1
40 TABLET, DELAYED RELEASE ORAL DAILY
Refills: 0 | Status: DISCONTINUED | OUTPATIENT
Start: 2023-12-11 | End: 2023-12-14

## 2023-12-11 RX ORDER — ACETAMINOPHEN 500 MG
650 TABLET ORAL ONCE
Refills: 0 | Status: COMPLETED | OUTPATIENT
Start: 2023-12-11 | End: 2023-12-11

## 2023-12-11 RX ORDER — METOPROLOL TARTRATE 50 MG
25 TABLET ORAL DAILY
Refills: 0 | Status: DISCONTINUED | OUTPATIENT
Start: 2023-12-11 | End: 2023-12-14

## 2023-12-11 RX ORDER — ACETAMINOPHEN 500 MG
650 TABLET ORAL EVERY 6 HOURS
Refills: 0 | Status: DISCONTINUED | OUTPATIENT
Start: 2023-12-11 | End: 2023-12-14

## 2023-12-11 RX ORDER — CIPROFLOXACIN LACTATE 400MG/40ML
400 VIAL (ML) INTRAVENOUS EVERY 24 HOURS
Refills: 0 | Status: DISCONTINUED | OUTPATIENT
Start: 2023-12-11 | End: 2023-12-12

## 2023-12-11 RX ORDER — ONDANSETRON 8 MG/1
4 TABLET, FILM COATED ORAL EVERY 8 HOURS
Refills: 0 | Status: DISCONTINUED | OUTPATIENT
Start: 2023-12-11 | End: 2023-12-14

## 2023-12-11 RX ORDER — NIFEDIPINE 30 MG
90 TABLET, EXTENDED RELEASE 24 HR ORAL DAILY
Refills: 0 | Status: DISCONTINUED | OUTPATIENT
Start: 2023-12-11 | End: 2023-12-14

## 2023-12-11 RX ADMIN — HEPARIN SODIUM 5000 UNIT(S): 5000 INJECTION INTRAVENOUS; SUBCUTANEOUS at 12:37

## 2023-12-11 RX ADMIN — Medication 25 MILLIGRAM(S): at 05:42

## 2023-12-11 RX ADMIN — Medication 90 MILLIGRAM(S): at 05:42

## 2023-12-11 RX ADMIN — Medication 100 MILLIGRAM(S): at 12:37

## 2023-12-11 RX ADMIN — Medication 200 MILLIGRAM(S): at 21:43

## 2023-12-11 RX ADMIN — Medication 100 MILLIGRAM(S): at 05:42

## 2023-12-11 RX ADMIN — PANTOPRAZOLE SODIUM 40 MILLIGRAM(S): 20 TABLET, DELAYED RELEASE ORAL at 11:41

## 2023-12-11 RX ADMIN — HEPARIN SODIUM 5000 UNIT(S): 5000 INJECTION INTRAVENOUS; SUBCUTANEOUS at 21:43

## 2023-12-11 RX ADMIN — Medication 100 MILLIGRAM(S): at 23:03

## 2023-12-11 RX ADMIN — Medication 650 MILLIGRAM(S): at 23:47

## 2023-12-11 RX ADMIN — HEPARIN SODIUM 5000 UNIT(S): 5000 INJECTION INTRAVENOUS; SUBCUTANEOUS at 05:43

## 2023-12-11 NOTE — H&P ADULT - HISTORY OF PRESENT ILLNESS
78 year old  female with PMH colon cancer in remission, MI s/p PCI with stent, HTN, HLD presents to the ED for diffuse body ache and weakness stated this morning upon waken up. Denies trauma, URI symptoms, N/V/D, abdominal pain, SOB, Chest pain. In the ED labs remarkable for H/H:7.9/25.0, BNP:4112, Lipase:106, Cr: 3.04.    CT- AND: Sigmoid diverticulitis. Colitis of the transverse colon    As per daughter - patient's main hospital is [facility], recently discharged from there after a 2 months stay.   Endorses patient is DNR/DNI and do not want any invasive procedures.   Patient has a history of Elopement from multiple hospital.   Daughter - Vonda: [phone]  Cardiologist: Dr. Gopal Manning: [phone]   78 year old  female with PMH colon cancer in remission, MI s/p PCI with stent, HTN, HLD presents to the ED for diffuse body ache and weakness stated this morning upon waken up. Denies trauma, URI symptoms, N/V/D, abdominal pain, SOB, Chest pain. In the ED labs remarkable for H/H:7.9/25.0, BNP:4112, Lipase:106, Cr: 3.04.    CT- AND: Sigmoid diverticulitis. Colitis of the transverse colon    As per daughter - patient's main hospital is [place], recently discharged from there after a 2 months stay.   Endorses patient is DNR/DNI and do not want any invasive procedures.   Patient has a history of Elopement from multiple hospital.   Daughter - Vonda: [phone]  Cardiologist: Dr. Gopal Manning: [phone]

## 2023-12-11 NOTE — H&P ADULT - NSHPPHYSICALEXAM_GEN_ALL_CORE
GENERAL: NAD, comfortable at bedside   HEAD:  Atraumatic, Normocephalic  EYES: EOMI, PERRL, conjunctiva and sclera clear  NECK: Supple, No JVD  CHEST: Clear to auscultation bilaterally; No wheezes, rales or rhonchi  HEART: Regular rate and rhythm; No murmurs, rubs, or gallops, (+)S1, S2  ABDOMEN: Soft, Nontender, Nondistended; Normal Bowel sounds   EXTREMITIES:  2+ Peripheral Pulses, No clubbing, cyanosis, or edema  PSYCH: normal mood and affect  NEUROLOGY: AAOx3, non-focal  SKIN: No rashes or lesions

## 2023-12-11 NOTE — H&P ADULT - PROBLEM SELECTOR PLAN 3
Continue home meds   - Nifedipine 90mg   - Metoprolol 25mg   Patient has an extensive list of cardiac med - As per daughter she takes all. To be confirmed with Cardiologist   (clonidine 0.2mg patch, Lasix 40mg, Hydralazine 100mg, Ibersartan 300mg, Isosorbide 60)

## 2023-12-11 NOTE — H&P ADULT - ASSESSMENT
78 year old  female with PMH colon cancer in remission, MI s/p PCI with stent, HTN, HLD presents to the ED for diffuse body ache and weakness stated this morning upon waken up. Denies trauma, URI symptoms, N/V/D, abdominal pain, SOB, Chest pain. In the ED labs remarkable for H/H:7.9/25.0, BNP:4112, Lipase:106, Cr: 3.04.    CT- AND: Sigmoid diverticulitis. Colitis of the transverse colon

## 2023-12-11 NOTE — CHART NOTE - NSCHARTNOTEFT_GEN_A_CORE
78 year old  female with PMH colon cancer in remission, MI s/p PCI with stent, HTN, HLD presents to the ED for diffuse body ache and weakness stated this morning upon waken up found to have Sigmoid diverticulitis and Colitis of the transverse colon.  - Seen and examined at bedside, remains comfortable and reduced body aches, but does endorse weakness.  - c/w cipro and flagyl   - anemia workup ordered, suspect this is from CKD  - f/u Scr in AM, renal ultrasound ordered. may need nephrology consult if no improvement    Felipe Adhikari MD  [facility] Division of Hospital Medicine  Available via MS Teams 78 year old  female with PMH colon cancer in remission, MI s/p PCI with stent, HTN, HLD presents to the ED for diffuse body ache and weakness stated this morning upon waken up found to have Sigmoid diverticulitis and Colitis of the transverse colon.  - Seen and examined at bedside, remains comfortable and reduced body aches, but does endorse weakness.  - c/w cipro and flagyl   - anemia workup ordered, suspect this is from CKD  - f/u Scr in AM, renal ultrasound ordered. may need nephrology consult if no improvement    Felipe Adhikari MD  [facility] Division of Hospital Medicine  Available via MS Teams

## 2023-12-11 NOTE — H&P ADULT - NSHPLABSRESULTS_GEN_ALL_CORE
7.9    6.29  )-----------( 108      ( 10 Dec 2023 16:35 )             25.0     137  |  108  |  86<H>  ----------------------------<  159<H>     1210  4.3   |  18<L>  |  3.04<H>    Ca    8.7      10 Dec 2023 16:35  Mg     2.5     12-10    TPro  6.9  /  Alb  2.9<L>  /  TBili  0.3  /  DBili  x   /  AST  15  /  ALT  10<L>  /  AlkPhos  82  12-10      Pro-BNP: 4112 (12-10-23 @ 16:35)    Urinalysis Basic - ( 11 Dec 2023 00:02 )  Color: Yellow / Appearance: Clear / S.010 / pH: 5.0  Gluc: Negative mg/dL / Ketone: Negative mg/dL  / Bili: Negative / Urobili: 0.2 mg/dL   Blood: Negative / Protein: Trace mg/dL / Nitrite: Negative   Leuk Esterase: Trace / RBC: 0-2 /HPF / WBC 3-5 /HPF   Sq Epi: Present / Non Sq Epi: x / Bacteria: Many /HPF    CY_ABD  LOWER CHEST: Cardiomegaly.    LIVER: Within normal limits.  BILE DUCTS: Normal caliber.  GALLBLADDER: Cholecystectomy.  SPLEEN: Within normal limits.  PANCREAS: Within normal limits.  ADRENALS: Within normal limits.  KIDNEYS/URETERS: No hydronephrosis. Punctate nonobstructing bilateral renal calculi.    BLADDER: Within normal limits.  REPRODUCTIVE ORGANS: Calcified uterine leiomyoma.    BOWEL: Colonic diverticulosis with inflammatory changes surrounding the sigmoid colon. Right hemicolectomy with ileocolic anastomosis. Inflammatory changes and mural thickening of the transverse colon. No bowel obstruction.  PERITONEUM: No ascites.  VESSELS: Atherosclerotic changes.  RETROPERITONEUM/LYMPH NODES: No lymphadenopathy.  ABDOMINAL WALL: Within normal limits.  BONES: Degenerative changes.    IMPRESSION:  Sigmoid diverticulitis.    Colitis of the transverse colon

## 2023-12-11 NOTE — H&P ADULT - PROBLEM SELECTOR PLAN 1
CT- AND: Sigmoid diverticulitis. Colitis of the transverse colon  - Cipro   - Flagyl   - PPI   - DVT prophylaxis  - F/u culture

## 2023-12-12 LAB
ALBUMIN SERPL ELPH-MCNC: 2.8 G/DL — LOW (ref 3.3–5)
ALBUMIN SERPL ELPH-MCNC: 2.8 G/DL — LOW (ref 3.3–5)
ALP SERPL-CCNC: 75 U/L — SIGNIFICANT CHANGE UP (ref 40–120)
ALP SERPL-CCNC: 75 U/L — SIGNIFICANT CHANGE UP (ref 40–120)
ALT FLD-CCNC: 10 U/L — LOW (ref 12–78)
ALT FLD-CCNC: 10 U/L — LOW (ref 12–78)
ANION GAP SERPL CALC-SCNC: 7 MMOL/L — SIGNIFICANT CHANGE UP (ref 5–17)
ANION GAP SERPL CALC-SCNC: 7 MMOL/L — SIGNIFICANT CHANGE UP (ref 5–17)
AST SERPL-CCNC: 13 U/L — LOW (ref 15–37)
AST SERPL-CCNC: 13 U/L — LOW (ref 15–37)
BASOPHILS # BLD AUTO: 0.02 K/UL — SIGNIFICANT CHANGE UP (ref 0–0.2)
BASOPHILS # BLD AUTO: 0.02 K/UL — SIGNIFICANT CHANGE UP (ref 0–0.2)
BASOPHILS NFR BLD AUTO: 0.4 % — SIGNIFICANT CHANGE UP (ref 0–2)
BASOPHILS NFR BLD AUTO: 0.4 % — SIGNIFICANT CHANGE UP (ref 0–2)
BILIRUB SERPL-MCNC: 0.3 MG/DL — SIGNIFICANT CHANGE UP (ref 0.2–1.2)
BILIRUB SERPL-MCNC: 0.3 MG/DL — SIGNIFICANT CHANGE UP (ref 0.2–1.2)
BUN SERPL-MCNC: 81 MG/DL — HIGH (ref 7–23)
BUN SERPL-MCNC: 81 MG/DL — HIGH (ref 7–23)
CALCIUM SERPL-MCNC: 8.7 MG/DL — SIGNIFICANT CHANGE UP (ref 8.5–10.1)
CALCIUM SERPL-MCNC: 8.7 MG/DL — SIGNIFICANT CHANGE UP (ref 8.5–10.1)
CHLORIDE SERPL-SCNC: 115 MMOL/L — HIGH (ref 96–108)
CHLORIDE SERPL-SCNC: 115 MMOL/L — HIGH (ref 96–108)
CO2 SERPL-SCNC: 18 MMOL/L — LOW (ref 22–31)
CO2 SERPL-SCNC: 18 MMOL/L — LOW (ref 22–31)
CREAT SERPL-MCNC: 2.5 MG/DL — HIGH (ref 0.5–1.3)
CREAT SERPL-MCNC: 2.5 MG/DL — HIGH (ref 0.5–1.3)
EGFR: 19 ML/MIN/1.73M2 — LOW
EGFR: 19 ML/MIN/1.73M2 — LOW
EOSINOPHIL # BLD AUTO: 0.03 K/UL — SIGNIFICANT CHANGE UP (ref 0–0.5)
EOSINOPHIL # BLD AUTO: 0.03 K/UL — SIGNIFICANT CHANGE UP (ref 0–0.5)
EOSINOPHIL NFR BLD AUTO: 0.7 % — SIGNIFICANT CHANGE UP (ref 0–6)
EOSINOPHIL NFR BLD AUTO: 0.7 % — SIGNIFICANT CHANGE UP (ref 0–6)
FERRITIN SERPL-MCNC: 155 NG/ML — SIGNIFICANT CHANGE UP (ref 13–330)
FERRITIN SERPL-MCNC: 155 NG/ML — SIGNIFICANT CHANGE UP (ref 13–330)
GLUCOSE SERPL-MCNC: 91 MG/DL — SIGNIFICANT CHANGE UP (ref 70–99)
GLUCOSE SERPL-MCNC: 91 MG/DL — SIGNIFICANT CHANGE UP (ref 70–99)
HCT VFR BLD CALC: 24 % — LOW (ref 34.5–45)
HCT VFR BLD CALC: 24 % — LOW (ref 34.5–45)
HGB BLD-MCNC: 7.6 G/DL — LOW (ref 11.5–15.5)
HGB BLD-MCNC: 7.6 G/DL — LOW (ref 11.5–15.5)
IMM GRANULOCYTES NFR BLD AUTO: 0.2 % — SIGNIFICANT CHANGE UP (ref 0–0.9)
IMM GRANULOCYTES NFR BLD AUTO: 0.2 % — SIGNIFICANT CHANGE UP (ref 0–0.9)
IRON SATN MFR SERPL: 23 % — SIGNIFICANT CHANGE UP (ref 14–50)
IRON SATN MFR SERPL: 23 % — SIGNIFICANT CHANGE UP (ref 14–50)
IRON SATN MFR SERPL: 50 UG/DL — SIGNIFICANT CHANGE UP (ref 30–160)
IRON SATN MFR SERPL: 50 UG/DL — SIGNIFICANT CHANGE UP (ref 30–160)
LYMPHOCYTES # BLD AUTO: 1.26 K/UL — SIGNIFICANT CHANGE UP (ref 1–3.3)
LYMPHOCYTES # BLD AUTO: 1.26 K/UL — SIGNIFICANT CHANGE UP (ref 1–3.3)
LYMPHOCYTES # BLD AUTO: 27.4 % — SIGNIFICANT CHANGE UP (ref 13–44)
LYMPHOCYTES # BLD AUTO: 27.4 % — SIGNIFICANT CHANGE UP (ref 13–44)
MAGNESIUM SERPL-MCNC: 2.7 MG/DL — HIGH (ref 1.6–2.6)
MAGNESIUM SERPL-MCNC: 2.7 MG/DL — HIGH (ref 1.6–2.6)
MCHC RBC-ENTMCNC: 29.2 PG — SIGNIFICANT CHANGE UP (ref 27–34)
MCHC RBC-ENTMCNC: 29.2 PG — SIGNIFICANT CHANGE UP (ref 27–34)
MCHC RBC-ENTMCNC: 31.7 G/DL — LOW (ref 32–36)
MCHC RBC-ENTMCNC: 31.7 G/DL — LOW (ref 32–36)
MCV RBC AUTO: 92.3 FL — SIGNIFICANT CHANGE UP (ref 80–100)
MCV RBC AUTO: 92.3 FL — SIGNIFICANT CHANGE UP (ref 80–100)
MONOCYTES # BLD AUTO: 0.39 K/UL — SIGNIFICANT CHANGE UP (ref 0–0.9)
MONOCYTES # BLD AUTO: 0.39 K/UL — SIGNIFICANT CHANGE UP (ref 0–0.9)
MONOCYTES NFR BLD AUTO: 8.5 % — SIGNIFICANT CHANGE UP (ref 2–14)
MONOCYTES NFR BLD AUTO: 8.5 % — SIGNIFICANT CHANGE UP (ref 2–14)
NEUTROPHILS # BLD AUTO: 2.89 K/UL — SIGNIFICANT CHANGE UP (ref 1.8–7.4)
NEUTROPHILS # BLD AUTO: 2.89 K/UL — SIGNIFICANT CHANGE UP (ref 1.8–7.4)
NEUTROPHILS NFR BLD AUTO: 62.8 % — SIGNIFICANT CHANGE UP (ref 43–77)
NEUTROPHILS NFR BLD AUTO: 62.8 % — SIGNIFICANT CHANGE UP (ref 43–77)
NRBC # BLD: 0 /100 WBCS — SIGNIFICANT CHANGE UP (ref 0–0)
NRBC # BLD: 0 /100 WBCS — SIGNIFICANT CHANGE UP (ref 0–0)
PHOSPHATE SERPL-MCNC: 4.5 MG/DL — SIGNIFICANT CHANGE UP (ref 2.5–4.5)
PHOSPHATE SERPL-MCNC: 4.5 MG/DL — SIGNIFICANT CHANGE UP (ref 2.5–4.5)
PLAT MORPH BLD: NORMAL — SIGNIFICANT CHANGE UP
PLAT MORPH BLD: NORMAL — SIGNIFICANT CHANGE UP
PLATELET # BLD AUTO: 101 K/UL — LOW (ref 150–400)
PLATELET # BLD AUTO: 101 K/UL — LOW (ref 150–400)
POTASSIUM SERPL-MCNC: 4.1 MMOL/L — SIGNIFICANT CHANGE UP (ref 3.5–5.3)
POTASSIUM SERPL-MCNC: 4.1 MMOL/L — SIGNIFICANT CHANGE UP (ref 3.5–5.3)
POTASSIUM SERPL-SCNC: 4.1 MMOL/L — SIGNIFICANT CHANGE UP (ref 3.5–5.3)
POTASSIUM SERPL-SCNC: 4.1 MMOL/L — SIGNIFICANT CHANGE UP (ref 3.5–5.3)
PROT SERPL-MCNC: 6.4 GM/DL — SIGNIFICANT CHANGE UP (ref 6–8.3)
PROT SERPL-MCNC: 6.4 GM/DL — SIGNIFICANT CHANGE UP (ref 6–8.3)
RBC # BLD: 2.6 M/UL — LOW (ref 3.8–5.2)
RBC # FLD: 13.5 % — SIGNIFICANT CHANGE UP (ref 10.3–14.5)
RBC # FLD: 13.5 % — SIGNIFICANT CHANGE UP (ref 10.3–14.5)
RBC BLD AUTO: ABNORMAL
RBC BLD AUTO: ABNORMAL
RETICS #: 55.4 K/UL — SIGNIFICANT CHANGE UP (ref 25–125)
RETICS #: 55.4 K/UL — SIGNIFICANT CHANGE UP (ref 25–125)
RETICS/RBC NFR: 2.1 % — SIGNIFICANT CHANGE UP (ref 0.5–2.5)
RETICS/RBC NFR: 2.1 % — SIGNIFICANT CHANGE UP (ref 0.5–2.5)
SODIUM SERPL-SCNC: 140 MMOL/L — SIGNIFICANT CHANGE UP (ref 135–145)
SODIUM SERPL-SCNC: 140 MMOL/L — SIGNIFICANT CHANGE UP (ref 135–145)
TIBC SERPL-MCNC: 215 UG/DL — LOW (ref 220–430)
TIBC SERPL-MCNC: 215 UG/DL — LOW (ref 220–430)
UIBC SERPL-MCNC: 165 UG/DL — SIGNIFICANT CHANGE UP (ref 110–370)
UIBC SERPL-MCNC: 165 UG/DL — SIGNIFICANT CHANGE UP (ref 110–370)
VIT B12 SERPL-MCNC: 471 PG/ML — SIGNIFICANT CHANGE UP (ref 232–1245)
VIT B12 SERPL-MCNC: 471 PG/ML — SIGNIFICANT CHANGE UP (ref 232–1245)
WBC # BLD: 4.6 K/UL — SIGNIFICANT CHANGE UP (ref 3.8–10.5)
WBC # BLD: 4.6 K/UL — SIGNIFICANT CHANGE UP (ref 3.8–10.5)
WBC # FLD AUTO: 4.6 K/UL — SIGNIFICANT CHANGE UP (ref 3.8–10.5)
WBC # FLD AUTO: 4.6 K/UL — SIGNIFICANT CHANGE UP (ref 3.8–10.5)

## 2023-12-12 PROCEDURE — 99232 SBSQ HOSP IP/OBS MODERATE 35: CPT

## 2023-12-12 RX ORDER — SODIUM BICARBONATE 1 MEQ/ML
0.1 SYRINGE (ML) INTRAVENOUS
Qty: 75 | Refills: 0 | Status: COMPLETED | OUTPATIENT
Start: 2023-12-12 | End: 2023-12-12

## 2023-12-12 RX ORDER — CEFTRIAXONE 500 MG/1
1000 INJECTION, POWDER, FOR SOLUTION INTRAMUSCULAR; INTRAVENOUS EVERY 24 HOURS
Refills: 0 | Status: DISCONTINUED | OUTPATIENT
Start: 2023-12-12 | End: 2023-12-14

## 2023-12-12 RX ADMIN — Medication 650 MILLIGRAM(S): at 00:38

## 2023-12-12 RX ADMIN — HEPARIN SODIUM 5000 UNIT(S): 5000 INJECTION INTRAVENOUS; SUBCUTANEOUS at 13:07

## 2023-12-12 RX ADMIN — Medication 100 MILLIGRAM(S): at 04:43

## 2023-12-12 RX ADMIN — Medication 90 MILLIGRAM(S): at 04:40

## 2023-12-12 RX ADMIN — Medication 25 MILLIGRAM(S): at 04:40

## 2023-12-12 RX ADMIN — Medication 100 MILLIGRAM(S): at 21:26

## 2023-12-12 RX ADMIN — HEPARIN SODIUM 5000 UNIT(S): 5000 INJECTION INTRAVENOUS; SUBCUTANEOUS at 04:47

## 2023-12-12 RX ADMIN — Medication 100 MILLIGRAM(S): at 13:09

## 2023-12-12 RX ADMIN — Medication 75 MEQ/KG/HR: at 23:53

## 2023-12-12 RX ADMIN — HEPARIN SODIUM 5000 UNIT(S): 5000 INJECTION INTRAVENOUS; SUBCUTANEOUS at 21:25

## 2023-12-12 RX ADMIN — PANTOPRAZOLE SODIUM 40 MILLIGRAM(S): 20 TABLET, DELAYED RELEASE ORAL at 13:07

## 2023-12-12 RX ADMIN — CEFTRIAXONE 100 MILLIGRAM(S): 500 INJECTION, POWDER, FOR SOLUTION INTRAMUSCULAR; INTRAVENOUS at 21:25

## 2023-12-12 NOTE — CONSULT NOTE ADULT - SUBJECTIVE AND OBJECTIVE BOX
[facility] NEPHROLOGY SERVICES, [facility]  NEPHROLOGY AND HYPERTENSION  [address]  [address]  [place] [address]  [phone]    MD FERMIN TORRES MD YELENA ROSENBERG, MD BINNY KOSHY, MD CHRISTOPHER CAPUTO, MD EDWARD BOVER, MD      Information from chart:  "Patient is a 78y old  Female who presents with a chief complaint of Colitis/Diverticulitis (12 Dec 2023 17:57)    HPI:  78 year old  female with PMH colon cancer in remission, MI s/p PCI with stent, HTN, HLD presents to the ED for diffuse body ache and weakness stated this morning upon waken up. Denies trauma, URI symptoms, N/V/D, abdominal pain, SOB, Chest pain. In the ED labs remarkable for H/H:7.9/25.0, BNP:4112, Lipase:106, Cr: 3.04.    CT- AND: Sigmoid diverticulitis. Colitis of the transverse colon    As per daughter - patient's main hospital is [place], recently discharged from there after a 2 months stay.   Endorses patient is DNR/DNI and do not want any invasive procedures.   Patient has a history of Elopement from multiple hospital.   Daughter - Vonda: [phone]  Cardiologist: Dr. Gopal Manning: [phone]   (11 Dec 2023 02:41)   "    No distress  poor po intake last few days       PAST MEDICAL & SURGICAL HISTORY:  Colon cancer        FAMILY HISTORY:    Allergies    No Known Allergies    Intolerances      Home Medications:  Valium 5 mg:  (11 Dec 2023 23:00)    MEDICATIONS  (STANDING):  cefTRIAXone   IVPB 1000 milliGRAM(s) IV Intermittent every 24 hours  heparin   Injectable 5000 Unit(s) SubCutaneous every 8 hours  metoprolol succinate ER 25 milliGRAM(s) Oral daily  metroNIDAZOLE  IVPB 500 milliGRAM(s) IV Intermittent every 8 hours  NIFEdipine XL 90 milliGRAM(s) Oral daily  pantoprazole  Injectable 40 milliGRAM(s) IV Push daily    MEDICATIONS  (PRN):  acetaminophen     Tablet .. 650 milliGRAM(s) Oral every 6 hours PRN Temp greater or equal to 38C (100.4F), Mild Pain (1 - 3)  aluminum hydroxide/magnesium hydroxide/simethicone Suspension 30 milliLiter(s) Oral every 4 hours PRN Dyspepsia  melatonin 3 milliGRAM(s) Oral at bedtime PRN Insomnia  ondansetron Injectable 4 milliGRAM(s) IV Push every 8 hours PRN Nausea and/or Vomiting    Vital Signs Last 24 Hrs  T(C): 36.6 (12 Dec 2023 15:56), Max: 37.2 (12 Dec 2023 00:07)  T(F): 97.9 (12 Dec 2023 15:56), Max: 99 (12 Dec 2023 00:07)  HR: 56 (12 Dec 2023 15:56) (56 - 73)  BP: 161/65 (12 Dec 2023 15:56) (136/54 - 186/62)  BP(mean): --  RR: 18 (12 Dec 2023 15:56) (18 - 18)  SpO2: 96% (12 Dec 2023 15:56) (93% - 96%)    Parameters below as of 12 Dec 2023 15:56  Patient On (Oxygen Delivery Method): room air        Daily     Daily Weight in k.3 (12 Dec 2023 04:58)    23 @ 07:01  -  23 @ 07:00  --------------------------------------------------------  IN: 200 mL / OUT: 0 mL / NET: 200 mL    23 @ 07:01  -  23 @ 22:40  --------------------------------------------------------  IN: 300 mL / OUT: 0 mL / NET: 300 mL      CAPILLARY BLOOD GLUCOSE        PHYSICAL EXAM:      T(C): 36.6 (23 @ 15:56), Max: 37.2 (23 @ 00:07)  HR: 56 (23 @ 15:56) (56 - 73)  BP: 161/65 (23 @ 15:56) (136/54 - 186/62)  RR: 18 (23 @ 15:56) (18 - 18)  SpO2: 96% (23 @ 15:56) (93% - 96%)  Wt(kg): --  Lungs clear  Heart S1S2  Abd soft NT ND  Extremities:   tr edema                  140  |  115<H>  |  81<H>  ----------------------------<  91  4.1   |  18<L>  |  2.50<H>    Ca    8.7      12 Dec 2023 07:30  Phos  4.5       Mg     2.7         TPro  6.4  /  Alb  2.8<L>  /  TBili  0.3  /  DBili  x   /  AST  13<L>  /  ALT  10<L>  /  AlkPhos  75                            7.6    4.60  )-----------( 101      ( 12 Dec 2023 07:30 )             24.0     Creatinine Trend: 2.50<--, 2.95<--, 3.04<--  Urinalysis Basic - ( 12 Dec 2023 07:30 )    Color: x / Appearance: x / SG: x / pH: x  Gluc: 91 mg/dL / Ketone: x  / Bili: x / Urobili: x   Blood: x / Protein: x / Nitrite: x   Leuk Esterase: x / RBC: x / WBC x   Sq Epi: x / Non Sq Epi: x / Bacteria: x      Trend Bun/Cr  23 @ 07:30  BUN/CR -  81<H> / 2.50<H>  23 @ 05:30  BUN/CR -  89<H> / 2.95<H>  12-10-23 @ 16:35  BUN/CR -  86<H> / 3.04<H>        Assessment   SHAYLEE degree of CKD unclear;   Pre renal azotemia   Complex renal cyst    Plan  Empiric IVF  Trend GFR decide on renal imaging with contrast at baseline     Brian Banerjee MD [facility] NEPHROLOGY SERVICES, [facility]  NEPHROLOGY AND HYPERTENSION  [address]  [address]  [place] [address]  [phone]    MD FERMIN TORRES MD YELENA ROSENBERG, MD BINNY KOSHY, MD CHRISTOPHER CAPUTO, MD EDWARD BOVER, MD      Information from chart:  "Patient is a 78y old  Female who presents with a chief complaint of Colitis/Diverticulitis (12 Dec 2023 17:57)    HPI:  78 year old  female with PMH colon cancer in remission, MI s/p PCI with stent, HTN, HLD presents to the ED for diffuse body ache and weakness stated this morning upon waken up. Denies trauma, URI symptoms, N/V/D, abdominal pain, SOB, Chest pain. In the ED labs remarkable for H/H:7.9/25.0, BNP:4112, Lipase:106, Cr: 3.04.    CT- AND: Sigmoid diverticulitis. Colitis of the transverse colon    As per daughter - patient's main hospital is [place], recently discharged from there after a 2 months stay.   Endorses patient is DNR/DNI and do not want any invasive procedures.   Patient has a history of Elopement from multiple hospital.   Daughter - Vonda: [phone]  Cardiologist: Dr. Gopal Manning: [phone]   (11 Dec 2023 02:41)   "    No distress  poor po intake last few days       PAST MEDICAL & SURGICAL HISTORY:  Colon cancer        FAMILY HISTORY:    Allergies    No Known Allergies    Intolerances      Home Medications:  Valium 5 mg:  (11 Dec 2023 23:00)    MEDICATIONS  (STANDING):  cefTRIAXone   IVPB 1000 milliGRAM(s) IV Intermittent every 24 hours  heparin   Injectable 5000 Unit(s) SubCutaneous every 8 hours  metoprolol succinate ER 25 milliGRAM(s) Oral daily  metroNIDAZOLE  IVPB 500 milliGRAM(s) IV Intermittent every 8 hours  NIFEdipine XL 90 milliGRAM(s) Oral daily  pantoprazole  Injectable 40 milliGRAM(s) IV Push daily    MEDICATIONS  (PRN):  acetaminophen     Tablet .. 650 milliGRAM(s) Oral every 6 hours PRN Temp greater or equal to 38C (100.4F), Mild Pain (1 - 3)  aluminum hydroxide/magnesium hydroxide/simethicone Suspension 30 milliLiter(s) Oral every 4 hours PRN Dyspepsia  melatonin 3 milliGRAM(s) Oral at bedtime PRN Insomnia  ondansetron Injectable 4 milliGRAM(s) IV Push every 8 hours PRN Nausea and/or Vomiting    Vital Signs Last 24 Hrs  T(C): 36.6 (12 Dec 2023 15:56), Max: 37.2 (12 Dec 2023 00:07)  T(F): 97.9 (12 Dec 2023 15:56), Max: 99 (12 Dec 2023 00:07)  HR: 56 (12 Dec 2023 15:56) (56 - 73)  BP: 161/65 (12 Dec 2023 15:56) (136/54 - 186/62)  BP(mean): --  RR: 18 (12 Dec 2023 15:56) (18 - 18)  SpO2: 96% (12 Dec 2023 15:56) (93% - 96%)    Parameters below as of 12 Dec 2023 15:56  Patient On (Oxygen Delivery Method): room air        Daily     Daily Weight in k.3 (12 Dec 2023 04:58)    23 @ 07:01  -  23 @ 07:00  --------------------------------------------------------  IN: 200 mL / OUT: 0 mL / NET: 200 mL    23 @ 07:01  -  23 @ 22:40  --------------------------------------------------------  IN: 300 mL / OUT: 0 mL / NET: 300 mL      CAPILLARY BLOOD GLUCOSE        PHYSICAL EXAM:      T(C): 36.6 (23 @ 15:56), Max: 37.2 (23 @ 00:07)  HR: 56 (23 @ 15:56) (56 - 73)  BP: 161/65 (23 @ 15:56) (136/54 - 186/62)  RR: 18 (23 @ 15:56) (18 - 18)  SpO2: 96% (23 @ 15:56) (93% - 96%)  Wt(kg): --  Lungs clear  Heart S1S2  Abd soft NT ND  Extremities:   tr edema                  140  |  115<H>  |  81<H>  ----------------------------<  91  4.1   |  18<L>  |  2.50<H>    Ca    8.7      12 Dec 2023 07:30  Phos  4.5       Mg     2.7         TPro  6.4  /  Alb  2.8<L>  /  TBili  0.3  /  DBili  x   /  AST  13<L>  /  ALT  10<L>  /  AlkPhos  75                            7.6    4.60  )-----------( 101      ( 12 Dec 2023 07:30 )             24.0     Creatinine Trend: 2.50<--, 2.95<--, 3.04<--  Urinalysis Basic - ( 12 Dec 2023 07:30 )    Color: x / Appearance: x / SG: x / pH: x  Gluc: 91 mg/dL / Ketone: x  / Bili: x / Urobili: x   Blood: x / Protein: x / Nitrite: x   Leuk Esterase: x / RBC: x / WBC x   Sq Epi: x / Non Sq Epi: x / Bacteria: x      Trend Bun/Cr  23 @ 07:30  BUN/CR -  81<H> / 2.50<H>  23 @ 05:30  BUN/CR -  89<H> / 2.95<H>  12-10-23 @ 16:35  BUN/CR -  86<H> / 3.04<H>        Assessment   SHAYLEE degree of CKD unclear;   Pre renal azotemia   Complex renal cyst    Plan  Empiric IVF  Trend GFR decide on renal imaging with contrast at baseline     Brian Banerjee MD

## 2023-12-12 NOTE — CHART NOTE - NSCHARTNOTEFT_GEN_A_CORE
78 year old  female with PMH colon cancer in remission, MI s/p PCI with stent, HTN, HLD presents to the ED for diffuse body ache and weakness stated this morning upon waken up. Denies trauma, URI symptoms, N/V/D, abdominal pain, SOB, Chest pain. In the ED labs remarkable for H/H:7.9/25.0, BNP:4112, Lipase:106, Cr: 3.04.    CT- AND: Sigmoid diverticulitis. Colitis of the transverse colon.     Notified by SAQIB Zaragoza that pt requesting to sign DNR/DNI. Spoke to daughter Vonda (RN) over the phone ([phone]), claims to be healthcare proxy/ power of .   Vonda claims her mother has a DNR/DNI, and would like comfort measures (antibiotics if needed, and pain medications if needed).   Claims cannot come to hospital due to procedure at hospital in am, and would not like her mother to know.   For any questions in the interim, call Vonda's  Cornelius ([phone]).   Discuss MOLST form in am when patient awake.   Daughter requesting to speak with cardiologist Dr Gopal Manning to confirm meds.   Will sign out to AM team. 78 year old  female with PMH colon cancer in remission, MI s/p PCI with stent, HTN, HLD presents to the ED for diffuse body ache and weakness stated this morning upon waken up. Denies trauma, URI symptoms, N/V/D, abdominal pain, SOB, Chest pain. In the ED labs remarkable for H/H:7.9/25.0, BNP:4112, Lipase:106, Cr: 3.04.    CT- AND: Sigmoid diverticulitis. Colitis of the transverse colon.     Notified by SAQIB Zaragoza that pt requesting to sign DNR/DNI. Spoke to daughter Vonda (RN) over the phone ([phone]), claims to be healthcare proxy/ power of .   Vonda claims her mother has a DNR/DNI, and would like comfort measures (antibiotics if needed, and pain medications if needed).   Claims cannot come to hospital due to procedure at hospital in am, and would not like her mother to know.   For any questions in the interim, call Vonda's  Cornelius ([phone]).   Discuss MOLST form in am when patient awake.   Daughter requesting to speak with cardiologist Dr Gopal Manning to confirm meds.   Will sign out to AM team. 78 year old  female with PMH colon cancer in remission, MI s/p PCI with stent, HTN, HLD presents to the ED for diffuse body ache and weakness stated this morning upon waken up. Denies trauma, URI symptoms, N/V/D, abdominal pain, SOB, Chest pain. In the ED labs remarkable for H/H:7.9/25.0, BNP:4112, Lipase:106, Cr: 3.04.    CT- AND: Sigmoid diverticulitis. Colitis of the transverse colon.     Notified by SAQIB Zaragoza that pt requesting to sign DNR/DNI. Spoke to daughter Vonda (RN) over the phone ([phone]), claims to be healthcare proxy/ power of .   Vonda claims her mother has an existing DNR/DNI, and would like "comfort measures" (antibiotics, IVF, pain meds if needed).   Claims cannot come to hospital due to procedure at hospital in am, and would not like her mother to know.   For any questions in the interim, call Vonda's  Cornelius ([phone]).   Discuss MOLST form in am when patient awake.   Daughter requesting to speak with cardiologist Dr Gopal Manning to confirm remainder of BP meds.   Will sign out to AM team.  Consider palliative consult for further GOC. 78 year old  female with PMH colon cancer in remission, MI s/p PCI with stent, HTN, HLD presents to the ED for diffuse body ache and weakness stated this morning upon waken up. Denies trauma, URI symptoms, N/V/D, abdominal pain, SOB, Chest pain. In the ED labs remarkable for H/H:7.9/25.0, BNP:4112, Lipase:106, Cr: 3.04.    CT- AND: Sigmoid diverticulitis. Colitis of the transverse colon.     Notified by SAQIB Zaragoza that pt requesting to sign DNR/DNI. Spoke to daughter Vonda (RN) over the phone ([phone]), claims to be healthcare proxy/ power of .   Vonda claims her mother has an existing DNR/DNI, and would like "comfort measures" (antibiotics, IVF, pain meds if needed).   Claims cannot come to hospital due to procedure at hospital in am, and would not like her mother to know.   For any questions in the interim, call Vonda's  Cornelius ([phone]).   Discuss MOLST form in am when patient awake.   Daughter requesting to speak with cardiologist Dr Gopal Manning to confirm remainder of BP meds.   Will sign out to AM team.  Consider palliative consult for further GOC.

## 2023-12-12 NOTE — GOALS OF CARE CONVERSATION - ADVANCED CARE PLANNING - CONVERSATION DETAILS
Patient alert and oriented. Discussed advanced care wishes.  Patient emphatic about her desire not to have chest compressions.  She also expressed that she does not wish to be intubated.  Support provided.  YULISA drafted and placed in chart.

## 2023-12-12 NOTE — PROGRESS NOTE ADULT - ASSESSMENT
78 year old  female with PMH colon cancer in remission, MI s/p PCI with stent, HTN, HLD admitted for generalized body ache, found to have Hgb 7.0, CT showing Sigmoid diverticulitis. Colitis of the transverse colon    # Sigmoid Diverticulitis, Colitis - Discussed outpatient GI followup for likely eventual Colonoscopy.  Continue Abx - adjusted to Rocephin / Flagyl.    # Essential HTN - Monitor BP.  Continue antihypertensives.  # CAD - Continue BBlocker, ARB.  Family to obtain pt's medication record.   Will f/u.  # DVT Prophylaxis - OOB

## 2023-12-12 NOTE — PROGRESS NOTE ADULT - SUBJECTIVE AND OBJECTIVE BOX
Patient: RAN DOMINGUEZ [id number] 78y Female                            Hospitalist Attending Note    No complaints.  No Abdominal pain, nausea, vomiting, diarrhea, nor constipation.   Tolerating po intake.  No fever/ chills    ____________________PHYSICAL EXAM:  GENERAL:  NAD Alert and Oriented x 3   HEENT: NCAT  CARDIOVASCULAR:  S1, S2  LUNGS: CTAB  ABDOMEN:  soft, (-) tenderness, (-) distension, (+) bowel sounds, (-) guarding, (-) rebound (-) rigidity  EXTREMITIES:  no cyanosis / clubbing / edema.   ____________________     VITALS:  Vital Signs Last 24 Hrs  T(C): 36.6 (12 Dec 2023 15:56), Max: 37.2 (12 Dec 2023 00:07)  T(F): 97.9 (12 Dec 2023 15:56), Max: 99 (12 Dec 2023 00:07)  HR: 56 (12 Dec 2023 15:56) (56 - 73)  BP: 161/65 (12 Dec 2023 15:56) (136/54 - 186/62)  BP(mean): --  RR: 18 (12 Dec 2023 15:56) (15 - 18)  SpO2: 96% (12 Dec 2023 15:56) (93% - 96%)    Parameters below as of 12 Dec 2023 15:56  Patient On (Oxygen Delivery Method): room air     Daily     Daily Weight in k.3 (12 Dec 2023 04:58)  CAPILLARY BLOOD GLUCOSE        I&O's Summary    11 Dec 2023 07:  -  12 Dec 2023 07:00  --------------------------------------------------------  IN: 200 mL / OUT: 0 mL / NET: 200 mL    12 Dec 2023 07:01  -  12 Dec 2023 18:01  --------------------------------------------------------  IN: 300 mL / OUT: 0 mL / NET: 300 mL        HISTORY:  PAST MEDICAL & SURGICAL HISTORY:  Colon cancer      Allergies    No Known Allergies    Intolerances       LABS:                        7.6    4.60  )-----------( 101      ( 12 Dec 2023 07:30 )             24.0       Culture - Urine (collected 11 Dec 2023 00:02)  Source: Clean Catch Clean Catch (Midstream)  Preliminary Report (12 Dec 2023 08:45):    Culture in progress        140  |  115<H>  |  81<H>  ----------------------------<  91  4.1   |  18<L>  |  2.50<H>    Ca    8.7      12 Dec 2023 07:30  Phos  4.5     12  Mg     2.7     12    TPro  6.4  /  Alb  2.8<L>  /  TBili  0.3  /  DBili  x   /  AST  13<L>  /  ALT  10<L>  /  AlkPhos  75  12-12      LIVER FUNCTIONS - ( 12 Dec 2023 07:30 )  Alb: 2.8 g/dL / Pro: 6.4 gm/dL / ALK PHOS: 75 U/L / ALT: 10 U/L / AST: 13 U/L / GGT: x           Urinalysis Basic - ( 12 Dec 2023 07:30 )    Color: x / Appearance: x / SG: x / pH: x  Gluc: 91 mg/dL / Ketone: x  / Bili: x / Urobili: x   Blood: x / Protein: x / Nitrite: x   Leuk Esterase: x / RBC: x / WBC x   Sq Epi: x / Non Sq Epi: x / Bacteria: x          Culture - Urine (collected 11 Dec 2023 00:02)  Source: Clean Catch Clean Catch (Midstream)  Preliminary Report (12 Dec 2023 08:45):    Culture in progress          MEDICATIONS:  MEDICATIONS  (STANDING):  cefTRIAXone   IVPB 1000 milliGRAM(s) IV Intermittent every 24 hours  heparin   Injectable 5000 Unit(s) SubCutaneous every 8 hours  metoprolol succinate ER 25 milliGRAM(s) Oral daily  metroNIDAZOLE  IVPB 500 milliGRAM(s) IV Intermittent every 8 hours  NIFEdipine XL 90 milliGRAM(s) Oral daily  pantoprazole  Injectable 40 milliGRAM(s) IV Push daily    MEDICATIONS  (PRN):  acetaminophen     Tablet .. 650 milliGRAM(s) Oral every 6 hours PRN Temp greater or equal to 38C (100.4F), Mild Pain (1 - 3)  aluminum hydroxide/magnesium hydroxide/simethicone Suspension 30 milliLiter(s) Oral every 4 hours PRN Dyspepsia  melatonin 3 milliGRAM(s) Oral at bedtime PRN Insomnia  ondansetron Injectable 4 milliGRAM(s) IV Push every 8 hours PRN Nausea and/or Vomiting                             Patient: RAN DOMINGUEZ [id number] 78y Female                            Hospitalist Attending Note    No complaints.  No Abdominal pain, nausea, vomiting, diarrhea, nor constipation.   Tolerating po intake.  No fever/ chills    ____________________PHYSICAL EXAM:  GENERAL:  NAD Alert and Oriented x 3   HEENT: NCAT  CARDIOVASCULAR:  S1, S2  LUNGS: CTAB  ABDOMEN:  soft, (-) tenderness, (-) distension, (+) bowel sounds, (-) guarding, (-) rebound (-) rigidity  EXTREMITIES:  no cyanosis / clubbing / edema.   ____________________     VITALS:  Vital Signs Last 24 Hrs  T(C): 36.6 (12 Dec 2023 15:56), Max: 37.2 (12 Dec 2023 00:07)  T(F): 97.9 (12 Dec 2023 15:56), Max: 99 (12 Dec 2023 00:07)  HR: 56 (12 Dec 2023 15:56) (56 - 73)  BP: 161/65 (12 Dec 2023 15:56) (136/54 - 186/62)  BP(mean): --  RR: 18 (12 Dec 2023 15:56) (15 - 18)  SpO2: 96% (12 Dec 2023 15:56) (93% - 96%)    Parameters below as of 12 Dec 2023 15:56  Patient On (Oxygen Delivery Method): room air     Daily     Daily Weight in k.3 (12 Dec 2023 04:58)  CAPILLARY BLOOD GLUCOSE        I&O's Summary    11 Dec 2023 07:  -  12 Dec 2023 07:00  --------------------------------------------------------  IN: 200 mL / OUT: 0 mL / NET: 200 mL    12 Dec 2023 07:01  -  12 Dec 2023 18:01  --------------------------------------------------------  IN: 300 mL / OUT: 0 mL / NET: 300 mL        HISTORY:  PAST MEDICAL & SURGICAL HISTORY:  Colon cancer      Allergies    No Known Allergies    Intolerances       LABS:                        7.6    4.60  )-----------( 101      ( 12 Dec 2023 07:30 )             24.0       Culture - Urine (collected 11 Dec 2023 00:02)  Source: Clean Catch Clean Catch (Midstream)  Preliminary Report (12 Dec 2023 08:45):    Culture in progress        140  |  115<H>  |  81<H>  ----------------------------<  91  4.1   |  18<L>  |  2.50<H>    Ca    8.7      12 Dec 2023 07:30  Phos  4.5     12  Mg     2.7     12    TPro  6.4  /  Alb  2.8<L>  /  TBili  0.3  /  DBili  x   /  AST  13<L>  /  ALT  10<L>  /  AlkPhos  75  12-12      LIVER FUNCTIONS - ( 12 Dec 2023 07:30 )  Alb: 2.8 g/dL / Pro: 6.4 gm/dL / ALK PHOS: 75 U/L / ALT: 10 U/L / AST: 13 U/L / GGT: x           Urinalysis Basic - ( 12 Dec 2023 07:30 )    Color: x / Appearance: x / SG: x / pH: x  Gluc: 91 mg/dL / Ketone: x  / Bili: x / Urobili: x   Blood: x / Protein: x / Nitrite: x   Leuk Esterase: x / RBC: x / WBC x   Sq Epi: x / Non Sq Epi: x / Bacteria: x          Culture - Urine (collected 11 Dec 2023 00:02)  Source: Clean Catch Clean Catch (Midstream)  Preliminary Report (12 Dec 2023 08:45):    Culture in progress          MEDICATIONS:  MEDICATIONS  (STANDING):  cefTRIAXone   IVPB 1000 milliGRAM(s) IV Intermittent every 24 hours  heparin   Injectable 5000 Unit(s) SubCutaneous every 8 hours  metoprolol succinate ER 25 milliGRAM(s) Oral daily  metroNIDAZOLE  IVPB 500 milliGRAM(s) IV Intermittent every 8 hours  NIFEdipine XL 90 milliGRAM(s) Oral daily  pantoprazole  Injectable 40 milliGRAM(s) IV Push daily    MEDICATIONS  (PRN):  acetaminophen     Tablet .. 650 milliGRAM(s) Oral every 6 hours PRN Temp greater or equal to 38C (100.4F), Mild Pain (1 - 3)  aluminum hydroxide/magnesium hydroxide/simethicone Suspension 30 milliLiter(s) Oral every 4 hours PRN Dyspepsia  melatonin 3 milliGRAM(s) Oral at bedtime PRN Insomnia  ondansetron Injectable 4 milliGRAM(s) IV Push every 8 hours PRN Nausea and/or Vomiting

## 2023-12-13 LAB
CULTURE RESULTS: SIGNIFICANT CHANGE UP
CULTURE RESULTS: SIGNIFICANT CHANGE UP
SPECIMEN SOURCE: SIGNIFICANT CHANGE UP
SPECIMEN SOURCE: SIGNIFICANT CHANGE UP

## 2023-12-13 PROCEDURE — 99232 SBSQ HOSP IP/OBS MODERATE 35: CPT

## 2023-12-13 RX ORDER — HYDRALAZINE HCL 50 MG
25 TABLET ORAL EVERY 8 HOURS
Refills: 0 | Status: DISCONTINUED | OUTPATIENT
Start: 2023-12-13 | End: 2023-12-14

## 2023-12-13 RX ADMIN — CEFTRIAXONE 100 MILLIGRAM(S): 500 INJECTION, POWDER, FOR SOLUTION INTRAMUSCULAR; INTRAVENOUS at 21:42

## 2023-12-13 RX ADMIN — Medication 650 MILLIGRAM(S): at 19:00

## 2023-12-13 RX ADMIN — Medication 650 MILLIGRAM(S): at 18:10

## 2023-12-13 RX ADMIN — HEPARIN SODIUM 5000 UNIT(S): 5000 INJECTION INTRAVENOUS; SUBCUTANEOUS at 05:01

## 2023-12-13 RX ADMIN — Medication 25 MILLIGRAM(S): at 15:01

## 2023-12-13 RX ADMIN — Medication 100 MILLIGRAM(S): at 05:01

## 2023-12-13 RX ADMIN — Medication 100 MILLIGRAM(S): at 21:42

## 2023-12-13 RX ADMIN — Medication 25 MILLIGRAM(S): at 21:42

## 2023-12-13 RX ADMIN — Medication 90 MILLIGRAM(S): at 05:01

## 2023-12-13 RX ADMIN — HEPARIN SODIUM 5000 UNIT(S): 5000 INJECTION INTRAVENOUS; SUBCUTANEOUS at 21:42

## 2023-12-13 RX ADMIN — Medication 100 MILLIGRAM(S): at 14:42

## 2023-12-13 RX ADMIN — Medication 3 MILLIGRAM(S): at 00:19

## 2023-12-13 RX ADMIN — PANTOPRAZOLE SODIUM 40 MILLIGRAM(S): 20 TABLET, DELAYED RELEASE ORAL at 14:42

## 2023-12-13 RX ADMIN — HEPARIN SODIUM 5000 UNIT(S): 5000 INJECTION INTRAVENOUS; SUBCUTANEOUS at 14:42

## 2023-12-13 NOTE — PHYSICAL THERAPY INITIAL EVALUATION ADULT - PERTINENT HX OF CURRENT PROBLEM, REHAB EVAL
Pt is a 79 yo female Pt is a 77 yo female with PMH colon cancer in remission, MI s/p PCI with stent, HTN, HLD admitted for generalized body ache, found to have Hgb 7.0, CT showing Sigmoid diverticulitis. Colitis of the transverse colon Pt is a 79 yo female with PMH colon cancer in remission, MI s/p PCI with stent, HTN, HLD admitted for generalized body ache, found to have Hgb 7.0, CT showing Sigmoid diverticulitis. Colitis of the transverse colon

## 2023-12-13 NOTE — PROGRESS NOTE ADULT - ASSESSMENT
78 year old  female with PMH colon cancer in remission, MI s/p PCI with stent, HTN, HLD admitted for generalized body ache, found to have Hgb 7.0, CT showing Sigmoid diverticulitis. Colitis of the transverse colon    # Sigmoid Diverticulitis, Colitis - Discussed outpatient GI followup for likely eventual Colonoscopy.  Continue Abx - adjusted to Rocephin / Flagyl.    # Essential HTN - Monitor BP.  Continue antihypertensives.  # CAD - Continue BBlocker, ARB.  Family to obtain pt's medication record.   Will f/u.  # Anemia - Hgb stable.  Check Fe profile in am.  No s/s of acute blood loss.    # DVT Prophylaxis - OOB    Plan of care d/w daughter Vonda [phone]  Per daughter's request, I assessed pt for capacity.  She is A&O x 4, able to demonstrate a clear understanding of her medical issues.  I left a message for Dr. Manning [phone] to discuss pt's medical history.   78 year old  female with PMH colon cancer in remission, MI s/p PCI with stent, HTN, HLD admitted for generalized body ache, found to have Hgb 7.0, CT showing Sigmoid diverticulitis. Colitis of the transverse colon    # Sigmoid Diverticulitis, Colitis - Discussed outpatient GI followup for likely eventual Colonoscopy.  Continue Abx - adjusted to Rocephin / Flagyl.    # Essential HTN - Monitor BP.  Continue antihypertensives.  # CAD - Continue BBlocker, ARB.  Family to obtain pt's medication record.   Will f/u.  # Anemia - Hgb stable.  Check Fe profile in am.  No s/s of acute blood loss.    # DVT Prophylaxis - OOB    Plan of care d/w daughter Vonda [phone]  Per daughter's request, I assessed pt for capacity.  She is A&O x 4, able to demonstrate a clear understanding of her medical issues.  I left a message for Dr. Manning [phone] to discuss pt's medical history.

## 2023-12-13 NOTE — PHYSICAL THERAPY INITIAL EVALUATION ADULT - ADDITIONAL COMMENTS
Pt lives with her daughter in a PH Pt lives with her daughter in a PH.  No clear answer from patient.  As per chart review, pt lives with spouse in a house with 4 steps to enter.  Pt owns a [employer] seen at bedside

## 2023-12-13 NOTE — PROGRESS NOTE ADULT - SUBJECTIVE AND OBJECTIVE BOX
Patient: RAN DOMINGUEZ [id number] 78y Female                            Hospitalist Attending Note    No complaints.   at bedside.  Daughter present via phone.  No Abdominal pain, nausea, vomiting, diarrhea, nor constipation.     ____________________PHYSICAL EXAM:  GENERAL:  NAD Alert and Oriented x 3   HEENT: NCAT  CARDIOVASCULAR:  S1, S2  LUNGS: CTAB  ABDOMEN:  soft, (-) tenderness, (-) distension, (+) bowel sounds, (-) guarding, (-) rebound (-) rigidity  EXTREMITIES:  no cyanosis / clubbing / edema.   ____________________     VITALS:  Vital Signs Last 24 Hrs  T(C): 36.6 (12 Dec 2023 15:56), Max: 37.2 (12 Dec 2023 00:07)  T(F): 97.9 (12 Dec 2023 15:56), Max: 99 (12 Dec 2023 00:07)  HR: 56 (12 Dec 2023 15:56) (56 - 73)  BP: 161/65 (12 Dec 2023 15:56) (136/54 - 186/62)  BP(mean): --  RR: 18 (12 Dec 2023 15:56) (15 - 18)  SpO2: 96% (12 Dec 2023 15:56) (93% - 96%)    Parameters below as of 12 Dec 2023 15:56  Patient On (Oxygen Delivery Method): room air     Daily     Daily Weight in k.3 (12 Dec 2023 04:58)  CAPILLARY BLOOD GLUCOSE        I&O's Summary    11 Dec 2023 07:01  -  12 Dec 2023 07:00  --------------------------------------------------------  IN: 200 mL / OUT: 0 mL / NET: 200 mL    12 Dec 2023 07:01  -  12 Dec 2023 18:01  --------------------------------------------------------  IN: 300 mL / OUT: 0 mL / NET: 300 mL        HISTORY:  PAST MEDICAL & SURGICAL HISTORY:  Colon cancer      Allergies    No Known Allergies    Intolerances       LABS:                        7.6    4.60  )-----------( 101      ( 12 Dec 2023 07:30 )             24.0       Culture - Urine (collected 11 Dec 2023 00:02)  Source: Clean Catch Clean Catch (Midstream)  Preliminary Report (12 Dec 2023 08:45):    Culture in progress        140  |  115<H>  |  81<H>  ----------------------------<  91  4.1   |  18<L>  |  2.50<H>    Ca    8.7      12 Dec 2023 07:30  Phos  4.5     12  Mg     2.7         TPro  6.4  /  Alb  2.8<L>  /  TBili  0.3  /  DBili  x   /  AST  13<L>  /  ALT  10<L>  /  AlkPhos  75  1212      LIVER FUNCTIONS - ( 12 Dec 2023 07:30 )  Alb: 2.8 g/dL / Pro: 6.4 gm/dL / ALK PHOS: 75 U/L / ALT: 10 U/L / AST: 13 U/L / GGT: x           Urinalysis Basic - ( 12 Dec 2023 07:30 )    Color: x / Appearance: x / SG: x / pH: x  Gluc: 91 mg/dL / Ketone: x  / Bili: x / Urobili: x   Blood: x / Protein: x / Nitrite: x   Leuk Esterase: x / RBC: x / WBC x   Sq Epi: x / Non Sq Epi: x / Bacteria: x          Culture - Urine (collected 11 Dec 2023 00:02)  Source: Clean Catch Clean Catch (Midstream)  Preliminary Report (12 Dec 2023 08:45):    Culture in progress          MEDICATIONS:  MEDICATIONS  (STANDING):  cefTRIAXone   IVPB 1000 milliGRAM(s) IV Intermittent every 24 hours  heparin   Injectable 5000 Unit(s) SubCutaneous every 8 hours  metoprolol succinate ER 25 milliGRAM(s) Oral daily  metroNIDAZOLE  IVPB 500 milliGRAM(s) IV Intermittent every 8 hours  NIFEdipine XL 90 milliGRAM(s) Oral daily  pantoprazole  Injectable 40 milliGRAM(s) IV Push daily    MEDICATIONS  (PRN):  acetaminophen     Tablet .. 650 milliGRAM(s) Oral every 6 hours PRN Temp greater or equal to 38C (100.4F), Mild Pain (1 - 3)  aluminum hydroxide/magnesium hydroxide/simethicone Suspension 30 milliLiter(s) Oral every 4 hours PRN Dyspepsia  melatonin 3 milliGRAM(s) Oral at bedtime PRN Insomnia  ondansetron Injectable 4 milliGRAM(s) IV Push every 8 hours PRN Nausea and/or Vomiting                             Patient: RAN DOMINGUEZ [id number] 78y Female                            Hospitalist Attending Note    No complaints.   at bedside.  Daughter present via phone.  No Abdominal pain, nausea, vomiting, diarrhea, nor constipation.     ____________________PHYSICAL EXAM:  GENERAL:  NAD Alert and Oriented x 3   HEENT: NCAT  CARDIOVASCULAR:  S1, S2  LUNGS: CTAB  ABDOMEN:  soft, (-) tenderness, (-) distension, (+) bowel sounds, (-) guarding, (-) rebound (-) rigidity  EXTREMITIES:  no cyanosis / clubbing / edema.   ____________________     VITALS:  Vital Signs Last 24 Hrs  T(C): 36.6 (12 Dec 2023 15:56), Max: 37.2 (12 Dec 2023 00:07)  T(F): 97.9 (12 Dec 2023 15:56), Max: 99 (12 Dec 2023 00:07)  HR: 56 (12 Dec 2023 15:56) (56 - 73)  BP: 161/65 (12 Dec 2023 15:56) (136/54 - 186/62)  BP(mean): --  RR: 18 (12 Dec 2023 15:56) (15 - 18)  SpO2: 96% (12 Dec 2023 15:56) (93% - 96%)    Parameters below as of 12 Dec 2023 15:56  Patient On (Oxygen Delivery Method): room air     Daily     Daily Weight in k.3 (12 Dec 2023 04:58)  CAPILLARY BLOOD GLUCOSE        I&O's Summary    11 Dec 2023 07:01  -  12 Dec 2023 07:00  --------------------------------------------------------  IN: 200 mL / OUT: 0 mL / NET: 200 mL    12 Dec 2023 07:01  -  12 Dec 2023 18:01  --------------------------------------------------------  IN: 300 mL / OUT: 0 mL / NET: 300 mL        HISTORY:  PAST MEDICAL & SURGICAL HISTORY:  Colon cancer      Allergies    No Known Allergies    Intolerances       LABS:                        7.6    4.60  )-----------( 101      ( 12 Dec 2023 07:30 )             24.0       Culture - Urine (collected 11 Dec 2023 00:02)  Source: Clean Catch Clean Catch (Midstream)  Preliminary Report (12 Dec 2023 08:45):    Culture in progress        140  |  115<H>  |  81<H>  ----------------------------<  91  4.1   |  18<L>  |  2.50<H>    Ca    8.7      12 Dec 2023 07:30  Phos  4.5     12  Mg     2.7         TPro  6.4  /  Alb  2.8<L>  /  TBili  0.3  /  DBili  x   /  AST  13<L>  /  ALT  10<L>  /  AlkPhos  75  1212      LIVER FUNCTIONS - ( 12 Dec 2023 07:30 )  Alb: 2.8 g/dL / Pro: 6.4 gm/dL / ALK PHOS: 75 U/L / ALT: 10 U/L / AST: 13 U/L / GGT: x           Urinalysis Basic - ( 12 Dec 2023 07:30 )    Color: x / Appearance: x / SG: x / pH: x  Gluc: 91 mg/dL / Ketone: x  / Bili: x / Urobili: x   Blood: x / Protein: x / Nitrite: x   Leuk Esterase: x / RBC: x / WBC x   Sq Epi: x / Non Sq Epi: x / Bacteria: x          Culture - Urine (collected 11 Dec 2023 00:02)  Source: Clean Catch Clean Catch (Midstream)  Preliminary Report (12 Dec 2023 08:45):    Culture in progress          MEDICATIONS:  MEDICATIONS  (STANDING):  cefTRIAXone   IVPB 1000 milliGRAM(s) IV Intermittent every 24 hours  heparin   Injectable 5000 Unit(s) SubCutaneous every 8 hours  metoprolol succinate ER 25 milliGRAM(s) Oral daily  metroNIDAZOLE  IVPB 500 milliGRAM(s) IV Intermittent every 8 hours  NIFEdipine XL 90 milliGRAM(s) Oral daily  pantoprazole  Injectable 40 milliGRAM(s) IV Push daily    MEDICATIONS  (PRN):  acetaminophen     Tablet .. 650 milliGRAM(s) Oral every 6 hours PRN Temp greater or equal to 38C (100.4F), Mild Pain (1 - 3)  aluminum hydroxide/magnesium hydroxide/simethicone Suspension 30 milliLiter(s) Oral every 4 hours PRN Dyspepsia  melatonin 3 milliGRAM(s) Oral at bedtime PRN Insomnia  ondansetron Injectable 4 milliGRAM(s) IV Push every 8 hours PRN Nausea and/or Vomiting

## 2023-12-13 NOTE — PHYSICAL THERAPY INITIAL EVALUATION ADULT - ACTIVE RANGE OF MOTION EXAMINATION, REHAB EVAL
mati. upper extremity Active ROM was WNL (within normal limits)/bilateral lower extremity Active ROM was WNL (within normal limits)

## 2023-12-13 NOTE — PROGRESS NOTE ADULT - SUBJECTIVE AND OBJECTIVE BOX
[facility] NEPHROLOGY SERVICES, [facility]  NEPHROLOGY AND HYPERTENSION  [address]  [address]  [place] [address]  [phone]    MD FERMIN TORRES, MD DULCE SHAW, MD MELBA JAIME, MD MIGUEL BASURTO MD          Patient events noted    MEDICATIONS  (STANDING):  cefTRIAXone   IVPB 1000 milliGRAM(s) IV Intermittent every 24 hours  heparin   Injectable 5000 Unit(s) SubCutaneous every 8 hours  hydrALAZINE 25 milliGRAM(s) Oral every 8 hours  metoprolol succinate ER 25 milliGRAM(s) Oral daily  metroNIDAZOLE  IVPB 500 milliGRAM(s) IV Intermittent every 8 hours  NIFEdipine XL 90 milliGRAM(s) Oral daily  pantoprazole  Injectable 40 milliGRAM(s) IV Push daily    MEDICATIONS  (PRN):  acetaminophen     Tablet .. 650 milliGRAM(s) Oral every 6 hours PRN Temp greater or equal to 38C (100.4F), Mild Pain (1 - 3)  aluminum hydroxide/magnesium hydroxide/simethicone Suspension 30 milliLiter(s) Oral every 4 hours PRN Dyspepsia  melatonin 3 milliGRAM(s) Oral at bedtime PRN Insomnia  ondansetron Injectable 4 milliGRAM(s) IV Push every 8 hours PRN Nausea and/or Vomiting      12-12-23 @ 07:01  -  12-13-23 @ 07:00  --------------------------------------------------------  IN: 300 mL / OUT: 0 mL / NET: 300 mL    12-13-23 @ 07:01  -  12-13-23 @ 17:46  --------------------------------------------------------  IN: 820 mL / OUT: 0 mL / NET: 820 mL      PHYSICAL EXAM:      T(C): 36.6 (12-13-23 @ 15:23), Max: 37 (12-12-23 @ 23:55)  HR: 66 (12-13-23 @ 15:23) (56 - 67)  BP: 166/53 (12-13-23 @ 15:23) (150/58 - 176/83)  RR: 18 (12-13-23 @ 15:23) (17 - 18)  SpO2: 96% (12-13-23 @ 15:23) (96% - 96%)  Wt(kg): --  Lungs clear  Heart S1S2  Abd soft NT ND  Extremities:   tr edema                                    7.6    4.60  )-----------( 101      ( 12 Dec 2023 07:30 )             24.0     12-12    140  |  115<H>  |  81<H>  ----------------------------<  91  4.1   |  18<L>  |  2.50<H>    Ca    8.7      12 Dec 2023 07:30  Phos  4.5     12-12  Mg     2.7     12-12    TPro  6.4  /  Alb  2.8<L>  /  TBili  0.3  /  DBili  x   /  AST  13<L>  /  ALT  10<L>  /  AlkPhos  75  12-12      LIVER FUNCTIONS - ( 12 Dec 2023 07:30 )  Alb: 2.8 g/dL / Pro: 6.4 gm/dL / ALK PHOS: 75 U/L / ALT: 10 U/L / AST: 13 U/L / GGT: x           Creatinine Trend: 2.50<--, 2.95<--, 3.04<--    < from: US Renal (12.11.23 @ 20:12) >  FINDINGS:  Right kidney: 9.8 cm. No hydronephrosis or calculi. 1.2 x 1.1 x 0.8 cm   complex right renal cyst with low-level internal echoes and a thin   internal septation.    Left kidney: 10.4 cm. No renal mass, hydronephrosis or calculi.    Urinary bladder: Prevoid urinary bladder has a volume of 233 cc. No gross   evidence for a mass or calculus in the urinary bladder. Both ureteral   jets are demonstrated. Small postvoid residual of 25 cc.    IMPRESSION:  1.2 cmcomplex right renal cyst. If clinically indicated, abdominal MR   without and with IV contrast may be pursued for further evaluation.    Small postvoid residual of 25 cc in the urinary bladder.    < end of copied text >      Assessment   SHAYLEE degree of CKD unclear;   Pre renal azotemia   Complex renal cyst  Improving with IVF     Plan  Continue IVF  Trend GFR decide on renal imaging with contrast at baseline   MM screen    Brian Banerjee MD [facility] NEPHROLOGY SERVICES, [facility]  NEPHROLOGY AND HYPERTENSION  [address]  [address]  [place] [address]  [phone]    MD FERMIN TORRES, MD DULCE SHAW, MD MELBA JAIME, MD MIGUEL BASURTO MD          Patient events noted    MEDICATIONS  (STANDING):  cefTRIAXone   IVPB 1000 milliGRAM(s) IV Intermittent every 24 hours  heparin   Injectable 5000 Unit(s) SubCutaneous every 8 hours  hydrALAZINE 25 milliGRAM(s) Oral every 8 hours  metoprolol succinate ER 25 milliGRAM(s) Oral daily  metroNIDAZOLE  IVPB 500 milliGRAM(s) IV Intermittent every 8 hours  NIFEdipine XL 90 milliGRAM(s) Oral daily  pantoprazole  Injectable 40 milliGRAM(s) IV Push daily    MEDICATIONS  (PRN):  acetaminophen     Tablet .. 650 milliGRAM(s) Oral every 6 hours PRN Temp greater or equal to 38C (100.4F), Mild Pain (1 - 3)  aluminum hydroxide/magnesium hydroxide/simethicone Suspension 30 milliLiter(s) Oral every 4 hours PRN Dyspepsia  melatonin 3 milliGRAM(s) Oral at bedtime PRN Insomnia  ondansetron Injectable 4 milliGRAM(s) IV Push every 8 hours PRN Nausea and/or Vomiting      12-12-23 @ 07:01  -  12-13-23 @ 07:00  --------------------------------------------------------  IN: 300 mL / OUT: 0 mL / NET: 300 mL    12-13-23 @ 07:01  -  12-13-23 @ 17:46  --------------------------------------------------------  IN: 820 mL / OUT: 0 mL / NET: 820 mL      PHYSICAL EXAM:      T(C): 36.6 (12-13-23 @ 15:23), Max: 37 (12-12-23 @ 23:55)  HR: 66 (12-13-23 @ 15:23) (56 - 67)  BP: 166/53 (12-13-23 @ 15:23) (150/58 - 176/83)  RR: 18 (12-13-23 @ 15:23) (17 - 18)  SpO2: 96% (12-13-23 @ 15:23) (96% - 96%)  Wt(kg): --  Lungs clear  Heart S1S2  Abd soft NT ND  Extremities:   tr edema                                    7.6    4.60  )-----------( 101      ( 12 Dec 2023 07:30 )             24.0     12-12    140  |  115<H>  |  81<H>  ----------------------------<  91  4.1   |  18<L>  |  2.50<H>    Ca    8.7      12 Dec 2023 07:30  Phos  4.5     12-12  Mg     2.7     12-12    TPro  6.4  /  Alb  2.8<L>  /  TBili  0.3  /  DBili  x   /  AST  13<L>  /  ALT  10<L>  /  AlkPhos  75  12-12      LIVER FUNCTIONS - ( 12 Dec 2023 07:30 )  Alb: 2.8 g/dL / Pro: 6.4 gm/dL / ALK PHOS: 75 U/L / ALT: 10 U/L / AST: 13 U/L / GGT: x           Creatinine Trend: 2.50<--, 2.95<--, 3.04<--    < from: US Renal (12.11.23 @ 20:12) >  FINDINGS:  Right kidney: 9.8 cm. No hydronephrosis or calculi. 1.2 x 1.1 x 0.8 cm   complex right renal cyst with low-level internal echoes and a thin   internal septation.    Left kidney: 10.4 cm. No renal mass, hydronephrosis or calculi.    Urinary bladder: Prevoid urinary bladder has a volume of 233 cc. No gross   evidence for a mass or calculus in the urinary bladder. Both ureteral   jets are demonstrated. Small postvoid residual of 25 cc.    IMPRESSION:  1.2 cmcomplex right renal cyst. If clinically indicated, abdominal MR   without and with IV contrast may be pursued for further evaluation.    Small postvoid residual of 25 cc in the urinary bladder.    < end of copied text >      Assessment   SHAYLEE degree of CKD unclear;   Pre renal azotemia   Complex renal cyst  Improving with IVF     Plan  Continue IVF  Trend GFR decide on renal imaging with contrast at baseline   MM screen    Brian Banerjee MD

## 2023-12-13 NOTE — PHYSICAL THERAPY INITIAL EVALUATION ADULT - NSACTIVITYREC_GEN_A_PT
Pt tolerated session fairly well.  Pt just received news of her daughter's illness and was very upset.  Pt's BP: 212/64 in the R arm and 209/61 in the L arm.  RN informed.  Pt has been ambulating with no device to the bathroom.  Pt's gait is steadier with the [facility].  Pt did not amb further because she was upset and PT ahs concerns about her elevated BP.  PT will follow.  RN aware Pt tolerated session fairly well.  Pt just received news of her daughter's illness and was very upset.  Pt's BP: 212/64 in the R arm and 209/61 in the L arm.  RN informed.  Pt has been ambulating with no device to the bathroom.  Pt's gait is steadier with the [facility].  Pt did not amb further because she was upset and PT ahs concerns about her elevated BP.  PT will follow.  RN aware Pt tolerated session fairly well.  Pt just received news of her daughter's illness and was very upset.  Pt's BP: 212/64 in the R arm and 209/61 in the L arm.  RN informed.  Pt has been ambulating with no device to the bathroom.  Pt's gait is steadier with the [facility].  Pt did not amb further because she was upset about a bad news she received and PT has concerns about her elevated BP.  PT will follow.  RN aware Pt tolerated session fairly well.  Pt just received news of her daughter's illness and was very upset.  Pt's BP: 212/64 in the R arm and 209/61 in the L arm.  RN informed.  Pt has been ambulating with no device to the bathroom.  [facility]  Pt did not amb further because she was upset about a bad news she received and PT has concerns about her elevated BP.  PT will follow.  RN aware

## 2023-12-14 ENCOUNTER — TRANSCRIPTION ENCOUNTER (OUTPATIENT)
Age: 78
End: 2023-12-14

## 2023-12-14 VITALS
HEART RATE: 56 BPM | RESPIRATION RATE: 17 BRPM | DIASTOLIC BLOOD PRESSURE: 56 MMHG | TEMPERATURE: 99 F | SYSTOLIC BLOOD PRESSURE: 141 MMHG | OXYGEN SATURATION: 97 %

## 2023-12-14 LAB
ANION GAP SERPL CALC-SCNC: 4 MMOL/L — LOW (ref 5–17)
ANION GAP SERPL CALC-SCNC: 4 MMOL/L — LOW (ref 5–17)
BUN SERPL-MCNC: 43 MG/DL — HIGH (ref 7–23)
BUN SERPL-MCNC: 43 MG/DL — HIGH (ref 7–23)
CALCIUM SERPL-MCNC: 8.8 MG/DL — SIGNIFICANT CHANGE UP (ref 8.5–10.1)
CALCIUM SERPL-MCNC: 8.8 MG/DL — SIGNIFICANT CHANGE UP (ref 8.5–10.1)
CHLORIDE SERPL-SCNC: 119 MMOL/L — HIGH (ref 96–108)
CHLORIDE SERPL-SCNC: 119 MMOL/L — HIGH (ref 96–108)
CO2 SERPL-SCNC: 21 MMOL/L — LOW (ref 22–31)
CO2 SERPL-SCNC: 21 MMOL/L — LOW (ref 22–31)
CREAT SERPL-MCNC: 1.19 MG/DL — SIGNIFICANT CHANGE UP (ref 0.5–1.3)
CREAT SERPL-MCNC: 1.19 MG/DL — SIGNIFICANT CHANGE UP (ref 0.5–1.3)
EGFR: 47 ML/MIN/1.73M2 — LOW
EGFR: 47 ML/MIN/1.73M2 — LOW
GLUCOSE SERPL-MCNC: 98 MG/DL — SIGNIFICANT CHANGE UP (ref 70–99)
GLUCOSE SERPL-MCNC: 98 MG/DL — SIGNIFICANT CHANGE UP (ref 70–99)
HCT VFR BLD CALC: 24.8 % — LOW (ref 34.5–45)
HCT VFR BLD CALC: 24.8 % — LOW (ref 34.5–45)
HGB BLD-MCNC: 7.6 G/DL — LOW (ref 11.5–15.5)
HGB BLD-MCNC: 7.6 G/DL — LOW (ref 11.5–15.5)
IGA FLD-MCNC: 161 MG/DL — SIGNIFICANT CHANGE UP (ref 84–499)
IGA FLD-MCNC: 161 MG/DL — SIGNIFICANT CHANGE UP (ref 84–499)
IGG FLD-MCNC: 812 MG/DL — SIGNIFICANT CHANGE UP (ref 610–1660)
IGG FLD-MCNC: 812 MG/DL — SIGNIFICANT CHANGE UP (ref 610–1660)
IGM SERPL-MCNC: 92 MG/DL — SIGNIFICANT CHANGE UP (ref 35–242)
IGM SERPL-MCNC: 92 MG/DL — SIGNIFICANT CHANGE UP (ref 35–242)
KAPPA LC SER QL IFE: 5.83 MG/DL — HIGH (ref 0.33–1.94)
KAPPA LC SER QL IFE: 5.83 MG/DL — HIGH (ref 0.33–1.94)
KAPPA/LAMBDA FREE LIGHT CHAIN RATIO, SERUM: 1.78 RATIO — HIGH (ref 0.26–1.65)
KAPPA/LAMBDA FREE LIGHT CHAIN RATIO, SERUM: 1.78 RATIO — HIGH (ref 0.26–1.65)
LAMBDA LC SER QL IFE: 3.27 MG/DL — HIGH (ref 0.57–2.63)
LAMBDA LC SER QL IFE: 3.27 MG/DL — HIGH (ref 0.57–2.63)
MCHC RBC-ENTMCNC: 28.7 PG — SIGNIFICANT CHANGE UP (ref 27–34)
MCHC RBC-ENTMCNC: 28.7 PG — SIGNIFICANT CHANGE UP (ref 27–34)
MCHC RBC-ENTMCNC: 30.6 G/DL — LOW (ref 32–36)
MCHC RBC-ENTMCNC: 30.6 G/DL — LOW (ref 32–36)
MCV RBC AUTO: 93.6 FL — SIGNIFICANT CHANGE UP (ref 80–100)
MCV RBC AUTO: 93.6 FL — SIGNIFICANT CHANGE UP (ref 80–100)
NRBC # BLD: 0 /100 WBCS — SIGNIFICANT CHANGE UP (ref 0–0)
NRBC # BLD: 0 /100 WBCS — SIGNIFICANT CHANGE UP (ref 0–0)
PLATELET # BLD AUTO: 105 K/UL — LOW (ref 150–400)
PLATELET # BLD AUTO: 105 K/UL — LOW (ref 150–400)
POTASSIUM SERPL-MCNC: 4 MMOL/L — SIGNIFICANT CHANGE UP (ref 3.5–5.3)
POTASSIUM SERPL-MCNC: 4 MMOL/L — SIGNIFICANT CHANGE UP (ref 3.5–5.3)
POTASSIUM SERPL-SCNC: 4 MMOL/L — SIGNIFICANT CHANGE UP (ref 3.5–5.3)
POTASSIUM SERPL-SCNC: 4 MMOL/L — SIGNIFICANT CHANGE UP (ref 3.5–5.3)
PROT ?TM UR-MCNC: 58 MG/DL — HIGH (ref 0–12)
PROT ?TM UR-MCNC: 58 MG/DL — HIGH (ref 0–12)
RBC # BLD: 2.65 M/UL — LOW (ref 3.8–5.2)
RBC # BLD: 2.65 M/UL — LOW (ref 3.8–5.2)
RBC # FLD: 13.9 % — SIGNIFICANT CHANGE UP (ref 10.3–14.5)
RBC # FLD: 13.9 % — SIGNIFICANT CHANGE UP (ref 10.3–14.5)
SODIUM SERPL-SCNC: 144 MMOL/L — SIGNIFICANT CHANGE UP (ref 135–145)
SODIUM SERPL-SCNC: 144 MMOL/L — SIGNIFICANT CHANGE UP (ref 135–145)
WBC # BLD: 4.28 K/UL — SIGNIFICANT CHANGE UP (ref 3.8–10.5)
WBC # BLD: 4.28 K/UL — SIGNIFICANT CHANGE UP (ref 3.8–10.5)
WBC # FLD AUTO: 4.28 K/UL — SIGNIFICANT CHANGE UP (ref 3.8–10.5)
WBC # FLD AUTO: 4.28 K/UL — SIGNIFICANT CHANGE UP (ref 3.8–10.5)

## 2023-12-14 PROCEDURE — 99239 HOSP IP/OBS DSCHRG MGMT >30: CPT

## 2023-12-14 RX ORDER — FUROSEMIDE 40 MG
0.5 TABLET ORAL
Qty: 0 | Refills: 0 | DISCHARGE

## 2023-12-14 RX ORDER — ISOSORBIDE MONONITRATE 60 MG/1
1 TABLET, EXTENDED RELEASE ORAL
Refills: 0 | DISCHARGE

## 2023-12-14 RX ORDER — FUROSEMIDE 40 MG
1 TABLET ORAL
Refills: 0 | DISCHARGE

## 2023-12-14 RX ORDER — ATENOLOL 25 MG/1
0.5 TABLET ORAL
Qty: 0 | Refills: 0 | DISCHARGE

## 2023-12-14 RX ORDER — NIFEDIPINE 30 MG
1 TABLET, EXTENDED RELEASE 24 HR ORAL
Refills: 0 | DISCHARGE

## 2023-12-14 RX ORDER — LOSARTAN POTASSIUM 100 MG/1
50 TABLET, FILM COATED ORAL DAILY
Refills: 0 | Status: DISCONTINUED | OUTPATIENT
Start: 2023-12-14 | End: 2023-12-14

## 2023-12-14 RX ORDER — HYDRALAZINE HCL 50 MG
75 TABLET ORAL ONCE
Refills: 0 | Status: COMPLETED | OUTPATIENT
Start: 2023-12-14 | End: 2023-12-14

## 2023-12-14 RX ORDER — HYDRALAZINE HCL 50 MG
1 TABLET ORAL
Refills: 0 | DISCHARGE

## 2023-12-14 RX ORDER — DIAZEPAM 5 MG
5 TABLET ORAL ONCE
Refills: 0 | Status: DISCONTINUED | OUTPATIENT
Start: 2023-12-14 | End: 2023-12-14

## 2023-12-14 RX ORDER — HYDRALAZINE HCL 50 MG
10 TABLET ORAL ONCE
Refills: 0 | Status: COMPLETED | OUTPATIENT
Start: 2023-12-14 | End: 2023-12-14

## 2023-12-14 RX ORDER — CEFPODOXIME PROXETIL 100 MG
1 TABLET ORAL
Qty: 20 | Refills: 0
Start: 2023-12-14

## 2023-12-14 RX ORDER — IRBESARTAN 75 MG/1
1 TABLET ORAL
Refills: 0 | DISCHARGE

## 2023-12-14 RX ORDER — HYDRALAZINE HCL 50 MG
100 TABLET ORAL EVERY 8 HOURS
Refills: 0 | Status: DISCONTINUED | OUTPATIENT
Start: 2023-12-14 | End: 2023-12-14

## 2023-12-14 RX ORDER — ATORVASTATIN CALCIUM 80 MG/1
1 TABLET, FILM COATED ORAL
Refills: 0 | DISCHARGE

## 2023-12-14 RX ORDER — POTASSIUM CHLORIDE 20 MEQ
1 PACKET (EA) ORAL
Refills: 0 | DISCHARGE

## 2023-12-14 RX ORDER — ISOSORBIDE MONONITRATE 60 MG/1
60 TABLET, EXTENDED RELEASE ORAL DAILY
Refills: 0 | Status: DISCONTINUED | OUTPATIENT
Start: 2023-12-14 | End: 2023-12-14

## 2023-12-14 RX ORDER — LEVOTHYROXINE SODIUM 125 MCG
1 TABLET ORAL
Refills: 0 | DISCHARGE

## 2023-12-14 RX ORDER — DIAZEPAM 5 MG
1 TABLET ORAL
Qty: 0 | Refills: 0 | DISCHARGE

## 2023-12-14 RX ORDER — SERTRALINE 25 MG/1
1 TABLET, FILM COATED ORAL
Refills: 0 | DISCHARGE

## 2023-12-14 RX ORDER — METRONIDAZOLE 500 MG
1 TABLET ORAL
Qty: 30 | Refills: 0
Start: 2023-12-14

## 2023-12-14 RX ADMIN — Medication 25 MILLIGRAM(S): at 05:03

## 2023-12-14 RX ADMIN — Medication 100 MILLIGRAM(S): at 14:31

## 2023-12-14 RX ADMIN — Medication 25 MILLIGRAM(S): at 11:16

## 2023-12-14 RX ADMIN — Medication 5 MILLIGRAM(S): at 11:16

## 2023-12-14 RX ADMIN — ISOSORBIDE MONONITRATE 60 MILLIGRAM(S): 60 TABLET, EXTENDED RELEASE ORAL at 14:31

## 2023-12-14 RX ADMIN — HEPARIN SODIUM 5000 UNIT(S): 5000 INJECTION INTRAVENOUS; SUBCUTANEOUS at 14:32

## 2023-12-14 RX ADMIN — LOSARTAN POTASSIUM 50 MILLIGRAM(S): 100 TABLET, FILM COATED ORAL at 14:31

## 2023-12-14 RX ADMIN — HEPARIN SODIUM 5000 UNIT(S): 5000 INJECTION INTRAVENOUS; SUBCUTANEOUS at 05:02

## 2023-12-14 RX ADMIN — Medication 75 MILLIGRAM(S): at 11:16

## 2023-12-14 RX ADMIN — Medication 100 MILLIGRAM(S): at 05:01

## 2023-12-14 RX ADMIN — Medication 0.2 MILLIGRAM(S): at 14:32

## 2023-12-14 RX ADMIN — PANTOPRAZOLE SODIUM 40 MILLIGRAM(S): 20 TABLET, DELAYED RELEASE ORAL at 11:39

## 2023-12-14 RX ADMIN — Medication 10 MILLIGRAM(S): at 01:22

## 2023-12-14 RX ADMIN — Medication 90 MILLIGRAM(S): at 05:02

## 2023-12-14 NOTE — DISCHARGE NOTE PROVIDER - CARE PROVIDERS DIRECT ADDRESSES
,DirectAddress_Unknown,DirectAddress_Unknown,DirectAddress_Unknown ,DirectAddress_Unknown,DirectAddress_Unknown,DirectAddress_Unknown,[email or web address] ,DirectAddress_Unknown,DirectAddress_Unknown,DirectAddress_Unknown,[email or web address]

## 2023-12-14 NOTE — DISCHARGE NOTE PROVIDER - NSDCMRMEDTOKEN_GEN_ALL_CORE_FT
atenolol 50 mg oral tablet: 1 tab(s) orally 2 times a day  atorvastatin 40 mg oral tablet: 1 tab(s) orally once a day (at bedtime)  cefpodoxime 200 mg oral tablet: 1 tab(s) orally 2 times a day  cloNIDine 0.2 mg/24 hr transdermal film, extended release: 1 patch transdermally every 7 days  hydrALAZINE 100 mg oral tablet: 1 tab(s) orally 3 times a day  irbesartan 300 mg oral tablet: 1 tab(s) orally once a day  isosorbide mononitrate 60 mg oral tablet, extended release: 1 tab(s) orally once a day  Lasix 40 mg oral tablet: 0.5 tab(s) orally once a day Restart on 12/21  metroNIDAZOLE 500 mg oral tablet: 1 tab(s) orally every 8 hours  Procardia XL 90 mg oral tablet, extended release: 1 tab(s) orally once a day  sertraline 50 mg oral tablet: 1 tab(s) orally once a day  Synthroid 137 mcg (0.137 mg) oral tablet: 1 tab(s) orally once a day  Valium 5 mg oral tablet: 1 tab(s) orally once a day   atenolol 50 mg oral tablet: 0.5 tab(s) orally once a day  atorvastatin 40 mg oral tablet: 1 tab(s) orally once a day (at bedtime)  cefpodoxime 200 mg oral tablet: 1 tab(s) orally 2 times a day  cloNIDine 0.1 mg oral tablet: 1 tab(s) orally every 8 hours  hydrALAZINE 100 mg oral tablet: 1 tab(s) orally 3 times a day  irbesartan 300 mg oral tablet: 1 tab(s) orally once a day  isosorbide mononitrate 60 mg oral tablet, extended release: 1 tab(s) orally once a day  Lasix 40 mg oral tablet: 0.5 tab(s) orally once a day Restart on 12/21  metroNIDAZOLE 500 mg oral tablet: 1 tab(s) orally every 8 hours  Procardia XL 90 mg oral tablet, extended release: 1 tab(s) orally once a day  sertraline 50 mg oral tablet: 1 tab(s) orally once a day  Synthroid 137 mcg (0.137 mg) oral tablet: 1 tab(s) orally once a day  Valium 5 mg oral tablet: 1 tab(s) orally once a day

## 2023-12-14 NOTE — DISCHARGE NOTE PROVIDER - NPI NUMBER (FOR SYSADMIN USE ONLY) :
[[id number]],[UNKNOWN] [[id number]],[[id number]],[UNKNOWN] [[id number]],[[id number]],[UNKNOWN],[[id number]] [[id number]],[[id number]],[UNKNOWN],[[id number]]

## 2023-12-14 NOTE — DISCHARGE NOTE NURSING/CASE MANAGEMENT/SOCIAL WORK - PATIENT PORTAL LINK FT
You can access the FollowMyHealth Patient Portal offered by [facility] by registering at the following website: [email or web address]. By joining [employer]’s FollowMyHealth portal, you will also be able to view your health information using other applications (apps) compatible with our system. You can access the FollowMyHealth Patient Portal offered by [facility] by registering at the following website: [email or web address]. By joining [employer]’s FollowMyHealth portal, you will also be able to view your health information using other applications (apps) compatible with our system.

## 2023-12-14 NOTE — DISCHARGE NOTE PROVIDER - NSDCFUADDAPPT_GEN_ALL_CORE_FT
APPTS ARE READY TO BE MADE: [X] YES    Best Family or Patient Contact (if needed):    Additional Information about above appointments (if needed):    1:   2:   3:     Other comments or requests:   During your hospitalization, you had abnormal findings on radiologic / laboratory studies.  Please see your primary doctor for followup of these findings to ensure that they have resolved.  You may require further evaluation to exclude certain serious conditions, and / or treatment.   < from: CT Abdomen and Pelvis No Cont (12.10.23 @ 18:39) >    Sigmoid diverticulitis.    Colitis of the transverse colon.      < end of copied text >     APPTS ARE READY TO BE MADE: [X] YES    Best Family or Patient Contact (if needed):    Additional Information about above appointments (if needed):    1: You must see Nephrology for followup of the abnormal studies below.    2:   3:     Other comments or requests:   During your hospitalization, you had abnormal findings on radiologic / laboratory studies.  Please see your primary doctor for followup of these findings to ensure that they have resolved.  You may require further evaluation to exclude certain serious conditions, and / or treatment.   < from: CT Abdomen and Pelvis No Cont (12.10.23 @ 18:39) >    Sigmoid diverticulitis.    Colitis of the transverse colon.      < end of copied text >    Immunoelectrophoresis, Serum (12.14.23 @ 07:36)    MEIR Kappa: 5.83 mg/dL    MEIR Lambda: 3.27 mg/dL     APPTS ARE READY TO BE MADE: [X] YES    Best Family or Patient Contact (if needed):    Additional Information about above appointments (if needed):    1: You must see Nephrology for followup of the abnormal studies below.    2:   3:     Other comments or requests:   During your hospitalization, you had abnormal findings on radiologic / laboratory studies.  Please see your primary doctor for followup of these findings to ensure that they have resolved.  You may require further evaluation to exclude certain serious conditions, and / or treatment.   < from: CT Abdomen and Pelvis No Cont (12.10.23 @ 18:39) >    Sigmoid diverticulitis.    Colitis of the transverse colon.      < end of copied text >    Immunoelectrophoresis, Serum (12.14.23 @ 07:36)    MEIR Kappa: 5.83 mg/dL    MEIR Lambda: 3.27 mg/dL    Patient was scheduled for an appointment on 12/26 1:30p at [address], [address] [place] [address] with Dr. Manning. Patient/Caregiver was advised of appointment details.    Patient was scheduled for an appointment on 01/08 11:30a at [address] [place] [address]  with Dr. Wang Patient/Caregiver was advised of appointment details.    Patient was scheduled for an appointment on 02/01 3:00p at [address] [facility], [address] [place] [phone] with Dr. Banerjee. Patient/Caregiver was advised of appointment details, they will reach out to patient with a sooner appointment.     APPTS ARE READY TO BE MADE: [X] YES    Best Family or Patient Contact (if needed):    Additional Information about above appointments (if needed):    1: You must see Nephrology for followup of the abnormal studies below.    2:   3:     Other comments or requests:   During your hospitalization, you had abnormal findings on radiologic / laboratory studies.  Please see your primary doctor for followup of these findings to ensure that they have resolved.  You may require further evaluation to exclude certain serious conditions, and / or treatment.   < from: CT Abdomen and Pelvis No Cont (12.10.23 @ 18:39) >    Sigmoid diverticulitis.    Colitis of the transverse colon.      < end of copied text >    Immunoelectrophoresis, Serum (12.14.23 @ 07:36)    MEIR Kappa: 5.83 mg/dL    MEIR Lambda: 3.27 mg/dL    Patient was scheduled for an appointment on 12/26 1:30p at [address] [facility] [address], [address] [place] [address] with Dr. Manning. Patient/Caregiver was advised of appointment details.    Patient was scheduled for an appointment on 01/08 11:30a at [address] [place] [address]  with Dr. Wang Patient/Caregiver was advised of appointment details.    Patient was scheduled for an appointment on 02/01 3:00p at [address] [facility], [address] [place] [phone] with Dr. Banerjee. Patient/Caregiver was advised of appointment details, they will reach out to patient with a sooner appointment.

## 2023-12-14 NOTE — DISCHARGE NOTE PROVIDER - PROVIDER TOKENS
PROVIDER:[TOKEN:[[id number]]],PROVIDER:[TOKEN:[[id number]]],FREE:[LAST:[Primary md],PHONE:[(   )    -],FAX:[(   )    -]] PROVIDER:[TOKEN:[[id number]]],PROVIDER:[TOKEN:[[id number]]],FREE:[LAST:[Primary md],PHONE:[(   )    -],FAX:[(   )    -]] PROVIDER:[TOKEN:[[id number]]],PROVIDER:[TOKEN:[[id number]]],FREE:[LAST:[Primary md],PHONE:[(   )    -],FAX:[(   )    -]],PROVIDER:[TOKEN:[[id number]]] PROVIDER:[TOKEN:[[id number]]],PROVIDER:[TOKEN:[[id number]]],FREE:[LAST:[Primary md],PHONE:[(   )    -],FAX:[(   )    -]],PROVIDER:[TOKEN:[[id number]]]

## 2023-12-14 NOTE — DISCHARGE NOTE PROVIDER - NSDCCPCAREPLAN_GEN_ALL_CORE_FT
PRINCIPAL DISCHARGE DIAGNOSIS  Diagnosis: Acute colitis  Assessment and Plan of Treatment:       SECONDARY DISCHARGE DIAGNOSES  Diagnosis: Weakness  Assessment and Plan of Treatment:     Diagnosis: Anemia  Assessment and Plan of Treatment:     Diagnosis: SHAYLEE (acute kidney injury)  Assessment and Plan of Treatment:     Diagnosis: Colitis  Assessment and Plan of Treatment:      PRINCIPAL DISCHARGE DIAGNOSIS  Diagnosis: Acute colitis  Assessment and Plan of Treatment:       SECONDARY DISCHARGE DIAGNOSES  Diagnosis: Weakness  Assessment and Plan of Treatment:     Diagnosis: Anemia  Assessment and Plan of Treatment:     Diagnosis: SHAYLEE (acute kidney injury)  Assessment and Plan of Treatment:     Diagnosis: Colitis  Assessment and Plan of Treatment:     Diagnosis: Abnormal SPEP  Assessment and Plan of Treatment:

## 2023-12-14 NOTE — PROGRESS NOTE ADULT - ASSESSMENT
78 year old  female with PMH colon cancer in remission, MI s/p PCI with stent, HTN, HLD admitted for generalized body ache, found to have Hgb 7.0, CT showing Sigmoid diverticulitis. Colitis of the transverse colon    # Sigmoid Diverticulitis, Colitis - Discussed outpatient GI followup for likely eventual Colonoscopy.  Continue Abx.  Change to po on discharge.  # Essential HTN - Monitor BP.  Clarified outaptient med reconciliation.  Resume meds.    # CAD - Continue BBlocker, ARB.  Family to obtain pt's medication record.   Will f/u.  # Anemia - Hgb stable.  Check Fe profile in am.  No s/s of acute blood loss.    # DVT Prophylaxis - OOB    Plan of care d/w daughter Vonda [phone] on 12/13.    Stable for d/c if BP better controlled.   78 year old  female with PMH colon cancer in remission, MI s/p PCI with stent, HTN, HLD admitted for generalized body ache, found to have Hgb 7.0, CT showing Sigmoid diverticulitis. Colitis of the transverse colon    # Sigmoid Diverticulitis, Colitis - Discussed outpatient GI followup for likely eventual Colonoscopy.  Continue Abx.  Change to po on discharge.  # Essential HTN - Monitor BP.  Clarified outaptient med reconciliation.  Resume meds.    # CAD - Continue BBlocker, ARB.  Family to obtain pt's medication record.   Will f/u.  # Anemia - Hgb stable.  Check Fe profile in am.  No s/s of acute blood loss.    # DVT Prophylaxis - OOB    Plan of care d/w daughter Vonda [phone] on 12/13.    Stable for d/c if BP better controlled.   78 year old  female with PMH colon cancer in remission, MI s/p PCI with stent, HTN, HLD admitted for generalized body ache, found to have Hgb 7.0, CT showing Sigmoid diverticulitis. Colitis of the transverse colon    # Sigmoid Diverticulitis, Colitis - Discussed outpatient GI followup for likely eventual Colonoscopy.  Continue Abx.  Change to po on discharge.  # SHAYLEE - Cr 3.0 on admission, now improved to 1.19.  Will hold diuretics x 1 week, resume as outpatient on reduced dosage.    # Essential HTN - Monitor BP.  Clarified outaptient med reconciliation.  Resume meds.    # CAD - Continue BBlocker, ARB.  Family to obtain pt's medication record.   Will f/u.  # Anemia - Hgb stable.  Fe profile not suggestive of deficiency.  No s/s of acute blood loss.    # Abnormal SPEP - elevated MEIR Kappa and Lambda.  Pt requires followup as outpatient.    # DVT Prophylaxis - OOB    Plan of care d/w daughter Vonda [phone] on 12/13.    Stable for d/c if BP better controlled.   78 year old  female with PMH colon cancer in remission, MI s/p PCI with stent, HTN, HLD admitted for generalized body ache, found to have Hgb 7.0, CT showing Sigmoid diverticulitis. Colitis of the transverse colon    # Sigmoid Diverticulitis, Colitis - Discussed outpatient GI followup for likely eventual Colonoscopy.  Continue Abx.  Change to po on discharge.  # SHAYLEE - Cr 3.0 on admission, now improved to 1.19.  Will hold diuretics x 1 week, resume as outpatient on reduced dosage.    # Essential HTN - Monitor BP.  Clarified outaptient med reconciliation.  Resume meds.    # CAD - Continue BBlocker, ARB.  Family to obtain pt's medication record.   Will f/u.  # Anemia - Hgb stable.  Fe profile not suggestive of deficiency.  No s/s of acute blood loss.    # Abnormal SPEP - elevated MEIR Kappa and Lambda.  Pt requires followup as outpatient.    # DVT Prophylaxis - OOB    Plan of care d/w daughter Vonda [phone] on 12/13.    Stable for d/c if BP better controlled.

## 2023-12-14 NOTE — DISCHARGE NOTE PROVIDER - CARE PROVIDER_API CALL
Ruthie Tripp  Gastroenterology  [address] [place] [address]  Phone: [phone]  Fax: [phone]  Follow Up Time:     Orlin Manning  Cardiovascular Disease  [address] [facility] [address]  [place] [address]  Phone: [phone]  Fax: [phone]  Follow Up Time:     Primary md,   Phone: (   )    -  Fax: (   )    -  Follow Up Time:    Ruthie Tripp  Gastroenterology  [address] [place] [address]  Phone: [phone]  Fax: [phone]  Follow Up Time:     Orlin Manning  Cardiovascular Disease  [address] [facility] [address]  [place] [address]  Phone: [phone]  Fax: [phone]  Follow Up Time:     Primary md,   Phone: (   )    -  Fax: (   )    -  Follow Up Time:    Ruthie Tripp  Gastroenterology  [address] [place] [address]  Phone: [phone]  Fax: [phone]  Follow Up Time:     Orlin Manning  Cardiovascular Disease  [address] [facility]  [place] [address]  Phone: [phone]  Fax: [phone]  Follow Up Time:     Primary md,   Phone: (   )    -  Fax: (   )    -  Follow Up Time:     Brian Banerjee  Nephrology  [address] [facility] [facility] [address]  [place] [address]  Phone: [phone]  Fax: [phone]  Follow Up Time:    Ruthie Tripp  Gastroenterology  [address] [place] [address]  Phone: [phone]  Fax: [phone]  Follow Up Time:     Orlin Manning  Cardiovascular Disease  [address] [facility]  [place] [address]  Phone: [phone]  Fax: [phone]  Follow Up Time:     Primary md,   Phone: (   )    -  Fax: (   )    -  Follow Up Time:     Brian Banerjee  Nephrology  [address] [facility], [facility] [address]  [place] [address]  Phone: [phone]  Fax: [phone]  Follow Up Time:

## 2023-12-14 NOTE — DISCHARGE NOTE NURSING/CASE MANAGEMENT/SOCIAL WORK - NSDCFUADDAPPT_GEN_ALL_CORE_FT
APPTS ARE READY TO BE MADE: [X] YES    Best Family or Patient Contact (if needed):    Additional Information about above appointments (if needed):    1: You must see Nephrology for followup of the abnormal studies below.    2:   3:     Other comments or requests:   During your hospitalization, you had abnormal findings on radiologic / laboratory studies.  Please see your primary doctor for followup of these findings to ensure that they have resolved.  You may require further evaluation to exclude certain serious conditions, and / or treatment.   < from: CT Abdomen and Pelvis No Cont (12.10.23 @ 18:39) >    Sigmoid diverticulitis.    Colitis of the transverse colon.      < end of copied text >    Immunoelectrophoresis, Serum (12.14.23 @ 07:36)    MEIR Kappa: 5.83 mg/dL    MEIR Lambda: 3.27 mg/dL

## 2023-12-14 NOTE — DISCHARGE NOTE NURSING/CASE MANAGEMENT/SOCIAL WORK - NSDCPEFALRISK_GEN_ALL_CORE
For information on Fall & Injury Prevention, visit: [email or web address] OR  [email or web address] OR  https://www.cdc.gov/steadi/patient.html For information on Fall & Injury Prevention, visit: [email or web address]://[email or web address].[email or web address] OR  [email or web address]://[email or web address].[email or web address]/[email or web address]-[email or web address]-[email or web address]-avoid-injury OR  https://www.cdc.gov/steadi/patient.html

## 2023-12-14 NOTE — DISCHARGE NOTE PROVIDER - NSDCFUADDINST_GEN_ALL_CORE_FT
It is important to see your primary physician as well as any specialty physicians within the next week to perform a comprehensive medical review.  Call their offices for an appointment as soon as you leave the hospital.  You will also need to see them for renewal of your medications.  If have any difficulty following with a physician, contact the [facility] [phone] or via [email or web address].   To obtain your results, you can access the [employer] Patient Portal at [email or web address].  Your medical issues appear to be stable at this time, but if your symptoms recur or worsen, contact your physicians and/or return to the hospital if necessary.  If you encounter any issues or questions with your medication, call your physicians before stopping the medication.  Do not drive.  Limit your diet to 2 grams of sodium daily.  It is important to see your primary physician as well as any specialty physicians within the next week to perform a comprehensive medical review.  Call their offices for an appointment as soon as you leave the hospital.  You will also need to see them for renewal of your medications.  If have any difficulty following with a physician, contact the [facility] [phone] or via [email or web address].   To obtain your results, you can access the [employer] Patient Portal at [email or web address].  Your medical issues appear to be stable at this time, but if your symptoms recur or worsen, contact your physicians and/or return to the hospital if necessary.  If you encounter any issues or questions with your medication, call your physicians before stopping the medication.  Do not drive.  Limit your diet to 2 grams of sodium daily.

## 2023-12-14 NOTE — DISCHARGE NOTE PROVIDER - HOSPITAL COURSE
78 year old  female with PMH colon cancer in remission, MI s/p PCI with stent, HTN, HLD admitted for generalized body ache, found to have Hgb 7.0, CT showing Sigmoid diverticulitis. Colitis of the transverse colon    # Sigmoid Diverticulitis, Colitis - Discussed outpatient GI followup for likely eventual Colonoscopy.  Continue Abx.  Change to po on discharge.  # SHAYELE - Cr 3.0 on admission, now improved to 1.19.  Will hold diuretics x 1 week, resume as outpatient on reduced dosage.    # Essential HTN - Monitor BP.  Clarified outaptient med reconciliation.  Resume meds.    # CAD - Continue BBlocker, ARB.  Family to obtain pt's medication record.   Will f/u.  # Anemia - Hgb stable.  Check Fe profile in am.  No s/s of acute blood loss.    # DVT Prophylaxis - OOB    Plan of care d/w daughter Vonda [phone] on 12/13.      Disposition: Stable for discharge.  Outpatient followup discussed.  Total time spent on discharge is  35  minutes. 78 year old  female with PMH colon cancer in remission, MI s/p PCI with stent, HTN, HLD admitted for generalized body ache, found to have Hgb 7.0, CT showing Sigmoid diverticulitis. Colitis of the transverse colon    # Sigmoid Diverticulitis, Colitis - Discussed outpatient GI followup for likely eventual Colonoscopy.  Continue Abx.  Change to po on discharge.  # SHAYLEE - Cr 3.0 on admission, now improved to 1.19.  Will hold diuretics x 1 week, resume as outpatient on reduced dosage.    # Essential HTN - Monitor BP.  Clarified outaptient med reconciliation.  Resume meds.    # CAD - Continue BBlocker, ARB.  Family to obtain pt's medication record.   Will f/u.  # Anemia - Hgb stable.  Check Fe profile in am.  No s/s of acute blood loss.    # DVT Prophylaxis - OOB    Plan of care d/w daughter Vonda [phone] on 12/13.      Disposition: Stable for discharge.  Outpatient followup discussed.  Total time spent on discharge is  35  minutes. no rash 78 year old  female with PMH colon cancer in remission, MI s/p PCI with stent, HTN, HLD admitted for generalized body ache, found to have Hgb 7.0, CT showing Sigmoid diverticulitis. Colitis of the transverse colon    # Sigmoid Diverticulitis, Colitis - Discussed outpatient GI followup for likely eventual Colonoscopy.  Continue Abx.  Change to po on discharge.  # SHAYLEE - Cr 3.0 on admission, now improved to 1.19.  Will hold diuretics x 1 week, resume as outpatient on reduced dosage.    # Essential HTN - Monitor BP.  Clarified outaptient med reconciliation.  Resume meds.    # CAD - Continue BBlocker, ARB.  Family to obtain pt's medication record.   Will f/u.  # Anemia - Hgb stable.  Fe profile not suggestive of deficiency.  No s/s of acute blood loss.    # Abnormal SPEP - elevated MEIR Kappa and Lambda.  Pt requires followup as outpatient.    # DVT Prophylaxis - OOB    Plan of care d/w daughter Vonda [phone] on 12/13.      Disposition: Stable for discharge.  Outpatient followup discussed.  Total time spent on discharge is  35  minutes. 78 year old  female with PMH colon cancer in remission, MI s/p PCI with stent, HTN, HLD admitted for generalized body ache, found to have Hgb 7.0, CT showing Sigmoid diverticulitis. Colitis of the transverse colon    # Sigmoid Diverticulitis, Colitis - Discussed outpatient GI followup for likely eventual Colonoscopy.  Continue Abx.  Change to po on discharge.  # SHAYLEE - Cr 3.0 on admission, now improved to 1.19.  Will hold diuretics x 1 week, resume as outpatient on reduced dosage.    # Essential HTN - Monitor BP.  Clarified outaptient med reconciliation.  Resume meds.    # CAD - Continue BBlocker, ARB.  Family to obtain pt's medication record.   Will f/u.  # Anemia - Hgb stable.  Fe profile not suggestive of deficiency.  No s/s of acute blood loss.    # Abnormal SPEP - elevated MEIR Kappa and Lambda.  Pt requires followup as outpatient.    # DVT Prophylaxis - OOB    Plan of care d/w daughter Vonda [phone] on 12/13.      Disposition: Stable for discharge.  Outpatient followup discussed.  Total time spent on discharge is  35  minutes. unknown

## 2023-12-14 NOTE — PROGRESS NOTE ADULT - SUBJECTIVE AND OBJECTIVE BOX
Patient: RAN DOMINGUEZ [id number] 78y Female                            Hospitalist Attending Note    No complaints.  No Abdominal pain, nausea, vomiting, diarrhea, nor constipation.   Tolerating po intake.      ____________________PHYSICAL EXAM:  GENERAL:  NAD Alert and Oriented x 3   HEENT: NCAT  CARDIOVASCULAR:  S1, S2  LUNGS: CTAB  ABDOMEN:  soft, (-) tenderness, (-) distension, (+) bowel sounds, (-) guarding, (-) rebound (-) rigidity  EXTREMITIES:  no cyanosis / clubbing / edema.   ____________________    VITALS:  Vital Signs Last 24 Hrs  T(C): 36.8 (14 Dec 2023 10:37), Max: 36.8 (14 Dec 2023 10:37)  T(F): 98.2 (14 Dec 2023 10:37), Max: 98.2 (14 Dec 2023 10:37)  HR: 90 (14 Dec 2023 14:24) (59 - 90)  BP: 172/56 (14 Dec 2023 14:24) (166/53 - 212/64)  BP(mean): 109 (13 Dec 2023 23:37) (109 - 109)  RR: 18 (14 Dec 2023 10:37) (18 - 18)  SpO2: 96% (14 Dec 2023 10:37) (96% - 96%)    Parameters below as of 14 Dec 2023 10:37  Patient On (Oxygen Delivery Method): room air     Daily     Daily   CAPILLARY BLOOD GLUCOSE        I&O's Summary    13 Dec 2023 07:01  -  14 Dec 2023 07:00  --------------------------------------------------------  IN: 820 mL / OUT: 0 mL / NET: 820 mL    14 Dec 2023 07:01  -  14 Dec 2023 15:11  --------------------------------------------------------  IN: 118 mL / OUT: 0 mL / NET: 118 mL        LABS:                        7.6    4.28  )-----------( 105      ( 14 Dec 2023 08:25 )             24.8     12-14    144  |  119<H>  |  43<H>  ----------------------------<  98  4.0   |  21<L>  |  1.19    Ca    8.8      14 Dec 2023 08:25          Urinalysis Basic - ( 14 Dec 2023 08:25 )    Color: x / Appearance: x / SG: x / pH: x  Gluc: 98 mg/dL / Ketone: x  / Bili: x / Urobili: x   Blood: x / Protein: x / Nitrite: x   Leuk Esterase: x / RBC: x / WBC x   Sq Epi: x / Non Sq Epi: x / Bacteria: x              MEDICATIONS:  acetaminophen     Tablet .. 650 milliGRAM(s) Oral every 6 hours PRN  aluminum hydroxide/magnesium hydroxide/simethicone Suspension 30 milliLiter(s) Oral every 4 hours PRN  cefTRIAXone   IVPB 1000 milliGRAM(s) IV Intermittent every 24 hours  cloNIDine 0.2 milliGRAM(s) Oral every 8 hours  heparin   Injectable 5000 Unit(s) SubCutaneous every 8 hours  hydrALAZINE 100 milliGRAM(s) Oral every 8 hours  isosorbide   mononitrate ER Tablet (IMDUR) 60 milliGRAM(s) Oral daily  losartan 50 milliGRAM(s) Oral daily  melatonin 3 milliGRAM(s) Oral at bedtime PRN  metoprolol succinate ER 25 milliGRAM(s) Oral daily  metroNIDAZOLE  IVPB 500 milliGRAM(s) IV Intermittent every 8 hours  NIFEdipine XL 90 milliGRAM(s) Oral daily  ondansetron Injectable 4 milliGRAM(s) IV Push every 8 hours PRN  pantoprazole  Injectable 40 milliGRAM(s) IV Push daily                               Patient: RAN DOMINGUEZ [id number] 78y Female                            Hospitalist Attending Note    No complaints.  No Abdominal pain, nausea, vomiting, diarrhea, nor constipation.   Tolerating po intake.      ____________________PHYSICAL EXAM:  GENERAL:  NAD Alert and Oriented x 3   HEENT: NCAT  CARDIOVASCULAR:  S1, S2  LUNGS: CTAB  ABDOMEN:  soft, (-) tenderness, (-) distension, (+) bowel sounds, (-) guarding, (-) rebound (-) rigidity  EXTREMITIES:  no cyanosis / clubbing / edema.   ____________________    VITALS:  Vital Signs Last 24 Hrs  T(C): 36.8 (14 Dec 2023 10:37), Max: 36.8 (14 Dec 2023 10:37)  T(F): 98.2 (14 Dec 2023 10:37), Max: 98.2 (14 Dec 2023 10:37)  HR: 90 (14 Dec 2023 14:24) (59 - 90)  BP: 172/56 (14 Dec 2023 14:24) (166/53 - 212/64)  BP(mean): 109 (13 Dec 2023 23:37) (109 - 109)  RR: 18 (14 Dec 2023 10:37) (18 - 18)  SpO2: 96% (14 Dec 2023 10:37) (96% - 96%)    Parameters below as of 14 Dec 2023 10:37  Patient On (Oxygen Delivery Method): room air     Daily     Daily   CAPILLARY BLOOD GLUCOSE        I&O's Summary    13 Dec 2023 07:01  -  14 Dec 2023 07:00  --------------------------------------------------------  IN: 820 mL / OUT: 0 mL / NET: 820 mL    14 Dec 2023 07:01  -  14 Dec 2023 15:11  --------------------------------------------------------  IN: 118 mL / OUT: 0 mL / NET: 118 mL        LABS:                        7.6    4.28  )-----------( 105      ( 14 Dec 2023 08:25 )             24.8     12-14    144  |  119<H>  |  43<H>  ----------------------------<  98  4.0   |  21<L>  |  1.19    Ca    8.8      14 Dec 2023 08:25          Urinalysis Basic - ( 14 Dec 2023 08:25 )    Color: x / Appearance: x / SG: x / pH: x  Gluc: 98 mg/dL / Ketone: x  / Bili: x / Urobili: x   Blood: x / Protein: x / Nitrite: x   Leuk Esterase: x / RBC: x / WBC x   Sq Epi: x / Non Sq Epi: x / Bacteria: x              MEDICATIONS:  acetaminophen     Tablet .. 650 milliGRAM(s) Oral every 6 hours PRN  aluminum hydroxide/magnesium hydroxide/simethicone Suspension 30 milliLiter(s) Oral every 4 hours PRN  cefTRIAXone   IVPB 1000 milliGRAM(s) IV Intermittent every 24 hours  cloNIDine 0.2 milliGRAM(s) Oral every 8 hours  heparin   Injectable 5000 Unit(s) SubCutaneous every 8 hours  hydrALAZINE 100 milliGRAM(s) Oral every 8 hours  isosorbide   mononitrate ER Tablet (IMDUR) 60 milliGRAM(s) Oral daily  losartan 50 milliGRAM(s) Oral daily  melatonin 3 milliGRAM(s) Oral at bedtime PRN  metoprolol succinate ER 25 milliGRAM(s) Oral daily  metroNIDAZOLE  IVPB 500 milliGRAM(s) IV Intermittent every 8 hours  NIFEdipine XL 90 milliGRAM(s) Oral daily  ondansetron Injectable 4 milliGRAM(s) IV Push every 8 hours PRN  pantoprazole  Injectable 40 milliGRAM(s) IV Push daily

## 2023-12-15 LAB
M PROTEIN 24H UR ELPH-MRATE: SIGNIFICANT CHANGE UP
M PROTEIN 24H UR ELPH-MRATE: SIGNIFICANT CHANGE UP

## 2023-12-17 ENCOUNTER — EMERGENCY (EMERGENCY)
Facility: HOSPITAL | Age: 78
LOS: 0 days | Discharge: HOME | End: 2023-12-17
Attending: STUDENT IN AN ORGANIZED HEALTH CARE EDUCATION/TRAINING PROGRAM
Payer: MEDICARE

## 2023-12-17 VITALS
TEMPERATURE: 101 F | HEIGHT: 68 IN | RESPIRATION RATE: 17 BRPM | SYSTOLIC BLOOD PRESSURE: 137 MMHG | OXYGEN SATURATION: 93 % | HEART RATE: 65 BPM | DIASTOLIC BLOOD PRESSURE: 51 MMHG | WEIGHT: 139.99 LBS

## 2023-12-17 VITALS
RESPIRATION RATE: 14 BRPM | DIASTOLIC BLOOD PRESSURE: 71 MMHG | HEART RATE: 64 BPM | OXYGEN SATURATION: 98 % | TEMPERATURE: 99 F | SYSTOLIC BLOOD PRESSURE: 130 MMHG

## 2023-12-17 DIAGNOSIS — R50.9 FEVER, UNSPECIFIED: ICD-10-CM

## 2023-12-17 DIAGNOSIS — Z95.5 PRESENCE OF CORONARY ANGIOPLASTY IMPLANT AND GRAFT: ICD-10-CM

## 2023-12-17 DIAGNOSIS — Z85.038 PERSONAL HISTORY OF OTHER MALIGNANT NEOPLASM OF LARGE INTESTINE: ICD-10-CM

## 2023-12-17 DIAGNOSIS — I10 ESSENTIAL (PRIMARY) HYPERTENSION: ICD-10-CM

## 2023-12-17 DIAGNOSIS — E78.5 HYPERLIPIDEMIA, UNSPECIFIED: ICD-10-CM

## 2023-12-17 DIAGNOSIS — R09.81 NASAL CONGESTION: ICD-10-CM

## 2023-12-17 DIAGNOSIS — R52 PAIN, UNSPECIFIED: ICD-10-CM

## 2023-12-17 DIAGNOSIS — I25.2 OLD MYOCARDIAL INFARCTION: ICD-10-CM

## 2023-12-17 DIAGNOSIS — U07.1 COVID-19: ICD-10-CM

## 2023-12-17 LAB
ALBUMIN SERPL ELPH-MCNC: 2.7 G/DL — LOW (ref 3.3–5)
ALBUMIN SERPL ELPH-MCNC: 2.7 G/DL — LOW (ref 3.3–5)
ALP SERPL-CCNC: 63 U/L — SIGNIFICANT CHANGE UP (ref 40–120)
ALP SERPL-CCNC: 63 U/L — SIGNIFICANT CHANGE UP (ref 40–120)
ALT FLD-CCNC: 10 U/L — LOW (ref 12–78)
ALT FLD-CCNC: 10 U/L — LOW (ref 12–78)
ANION GAP SERPL CALC-SCNC: 5 MMOL/L — SIGNIFICANT CHANGE UP (ref 5–17)
ANION GAP SERPL CALC-SCNC: 5 MMOL/L — SIGNIFICANT CHANGE UP (ref 5–17)
ANION GAP SERPL CALC-SCNC: 7 MMOL/L — SIGNIFICANT CHANGE UP (ref 5–17)
ANION GAP SERPL CALC-SCNC: 7 MMOL/L — SIGNIFICANT CHANGE UP (ref 5–17)
AST SERPL-CCNC: 17 U/L — SIGNIFICANT CHANGE UP (ref 15–37)
AST SERPL-CCNC: 17 U/L — SIGNIFICANT CHANGE UP (ref 15–37)
BASOPHILS # BLD AUTO: 0.01 K/UL — SIGNIFICANT CHANGE UP (ref 0–0.2)
BASOPHILS # BLD AUTO: 0.01 K/UL — SIGNIFICANT CHANGE UP (ref 0–0.2)
BASOPHILS NFR BLD AUTO: 0.2 % — SIGNIFICANT CHANGE UP (ref 0–2)
BASOPHILS NFR BLD AUTO: 0.2 % — SIGNIFICANT CHANGE UP (ref 0–2)
BILIRUB SERPL-MCNC: 0.3 MG/DL — SIGNIFICANT CHANGE UP (ref 0.2–1.2)
BILIRUB SERPL-MCNC: 0.3 MG/DL — SIGNIFICANT CHANGE UP (ref 0.2–1.2)
BUN SERPL-MCNC: 29 MG/DL — HIGH (ref 7–23)
BUN SERPL-MCNC: 29 MG/DL — HIGH (ref 7–23)
BUN SERPL-MCNC: 30 MG/DL — HIGH (ref 7–23)
BUN SERPL-MCNC: 30 MG/DL — HIGH (ref 7–23)
CALCIUM SERPL-MCNC: 8.1 MG/DL — LOW (ref 8.5–10.1)
CALCIUM SERPL-MCNC: 8.1 MG/DL — LOW (ref 8.5–10.1)
CALCIUM SERPL-MCNC: 8.5 MG/DL — SIGNIFICANT CHANGE UP (ref 8.5–10.1)
CALCIUM SERPL-MCNC: 8.5 MG/DL — SIGNIFICANT CHANGE UP (ref 8.5–10.1)
CHLORIDE SERPL-SCNC: 110 MMOL/L — HIGH (ref 96–108)
CHLORIDE SERPL-SCNC: 110 MMOL/L — HIGH (ref 96–108)
CHLORIDE SERPL-SCNC: 113 MMOL/L — HIGH (ref 96–108)
CHLORIDE SERPL-SCNC: 113 MMOL/L — HIGH (ref 96–108)
CO2 SERPL-SCNC: 20 MMOL/L — LOW (ref 22–31)
CO2 SERPL-SCNC: 20 MMOL/L — LOW (ref 22–31)
CO2 SERPL-SCNC: 21 MMOL/L — LOW (ref 22–31)
CO2 SERPL-SCNC: 21 MMOL/L — LOW (ref 22–31)
CREAT SERPL-MCNC: 1.56 MG/DL — HIGH (ref 0.5–1.3)
CREAT SERPL-MCNC: 1.56 MG/DL — HIGH (ref 0.5–1.3)
CREAT SERPL-MCNC: 1.75 MG/DL — HIGH (ref 0.5–1.3)
CREAT SERPL-MCNC: 1.75 MG/DL — HIGH (ref 0.5–1.3)
EGFR: 29 ML/MIN/1.73M2 — LOW
EGFR: 29 ML/MIN/1.73M2 — LOW
EGFR: 34 ML/MIN/1.73M2 — LOW
EGFR: 34 ML/MIN/1.73M2 — LOW
EOSINOPHIL # BLD AUTO: 0.01 K/UL — SIGNIFICANT CHANGE UP (ref 0–0.5)
EOSINOPHIL # BLD AUTO: 0.01 K/UL — SIGNIFICANT CHANGE UP (ref 0–0.5)
EOSINOPHIL NFR BLD AUTO: 0.2 % — SIGNIFICANT CHANGE UP (ref 0–6)
EOSINOPHIL NFR BLD AUTO: 0.2 % — SIGNIFICANT CHANGE UP (ref 0–6)
GLUCOSE SERPL-MCNC: 107 MG/DL — HIGH (ref 70–99)
GLUCOSE SERPL-MCNC: 107 MG/DL — HIGH (ref 70–99)
GLUCOSE SERPL-MCNC: 117 MG/DL — HIGH (ref 70–99)
GLUCOSE SERPL-MCNC: 117 MG/DL — HIGH (ref 70–99)
HCT VFR BLD CALC: 25.4 % — LOW (ref 34.5–45)
HCT VFR BLD CALC: 25.4 % — LOW (ref 34.5–45)
HGB BLD-MCNC: 7.7 G/DL — LOW (ref 11.5–15.5)
HGB BLD-MCNC: 7.7 G/DL — LOW (ref 11.5–15.5)
IMM GRANULOCYTES NFR BLD AUTO: 0.5 % — SIGNIFICANT CHANGE UP (ref 0–0.9)
IMM GRANULOCYTES NFR BLD AUTO: 0.5 % — SIGNIFICANT CHANGE UP (ref 0–0.9)
LYMPHOCYTES # BLD AUTO: 0.54 K/UL — LOW (ref 1–3.3)
LYMPHOCYTES # BLD AUTO: 0.54 K/UL — LOW (ref 1–3.3)
LYMPHOCYTES # BLD AUTO: 12.8 % — LOW (ref 13–44)
LYMPHOCYTES # BLD AUTO: 12.8 % — LOW (ref 13–44)
MCHC RBC-ENTMCNC: 28.3 PG — SIGNIFICANT CHANGE UP (ref 27–34)
MCHC RBC-ENTMCNC: 28.3 PG — SIGNIFICANT CHANGE UP (ref 27–34)
MCHC RBC-ENTMCNC: 30.3 G/DL — LOW (ref 32–36)
MCHC RBC-ENTMCNC: 30.3 G/DL — LOW (ref 32–36)
MCV RBC AUTO: 93.4 FL — SIGNIFICANT CHANGE UP (ref 80–100)
MCV RBC AUTO: 93.4 FL — SIGNIFICANT CHANGE UP (ref 80–100)
MONOCYTES # BLD AUTO: 0.61 K/UL — SIGNIFICANT CHANGE UP (ref 0–0.9)
MONOCYTES # BLD AUTO: 0.61 K/UL — SIGNIFICANT CHANGE UP (ref 0–0.9)
MONOCYTES NFR BLD AUTO: 14.4 % — HIGH (ref 2–14)
MONOCYTES NFR BLD AUTO: 14.4 % — HIGH (ref 2–14)
NEUTROPHILS # BLD AUTO: 3.04 K/UL — SIGNIFICANT CHANGE UP (ref 1.8–7.4)
NEUTROPHILS # BLD AUTO: 3.04 K/UL — SIGNIFICANT CHANGE UP (ref 1.8–7.4)
NEUTROPHILS NFR BLD AUTO: 71.9 % — SIGNIFICANT CHANGE UP (ref 43–77)
NEUTROPHILS NFR BLD AUTO: 71.9 % — SIGNIFICANT CHANGE UP (ref 43–77)
NRBC # BLD: 0 /100 WBCS — SIGNIFICANT CHANGE UP (ref 0–0)
NRBC # BLD: 0 /100 WBCS — SIGNIFICANT CHANGE UP (ref 0–0)
PLATELET # BLD AUTO: 89 K/UL — LOW (ref 150–400)
PLATELET # BLD AUTO: 89 K/UL — LOW (ref 150–400)
POTASSIUM SERPL-MCNC: 4.1 MMOL/L — SIGNIFICANT CHANGE UP (ref 3.5–5.3)
POTASSIUM SERPL-SCNC: 4.1 MMOL/L — SIGNIFICANT CHANGE UP (ref 3.5–5.3)
PROT SERPL-MCNC: 6.4 GM/DL — SIGNIFICANT CHANGE UP (ref 6–8.3)
PROT SERPL-MCNC: 6.4 GM/DL — SIGNIFICANT CHANGE UP (ref 6–8.3)
RAPID RVP RESULT: DETECTED
RAPID RVP RESULT: DETECTED
RBC # BLD: 2.72 M/UL — LOW (ref 3.8–5.2)
RBC # BLD: 2.72 M/UL — LOW (ref 3.8–5.2)
RBC # FLD: 14.3 % — SIGNIFICANT CHANGE UP (ref 10.3–14.5)
RBC # FLD: 14.3 % — SIGNIFICANT CHANGE UP (ref 10.3–14.5)
SARS-COV-2 RNA SPEC QL NAA+PROBE: DETECTED
SARS-COV-2 RNA SPEC QL NAA+PROBE: DETECTED
SODIUM SERPL-SCNC: 138 MMOL/L — SIGNIFICANT CHANGE UP (ref 135–145)
WBC # BLD: 4.23 K/UL — SIGNIFICANT CHANGE UP (ref 3.8–10.5)
WBC # BLD: 4.23 K/UL — SIGNIFICANT CHANGE UP (ref 3.8–10.5)
WBC # FLD AUTO: 4.23 K/UL — SIGNIFICANT CHANGE UP (ref 3.8–10.5)
WBC # FLD AUTO: 4.23 K/UL — SIGNIFICANT CHANGE UP (ref 3.8–10.5)

## 2023-12-17 PROCEDURE — 99284 EMERGENCY DEPT VISIT MOD MDM: CPT

## 2023-12-17 PROCEDURE — 71045 X-RAY EXAM CHEST 1 VIEW: CPT | Mod: 26

## 2023-12-17 RX ORDER — ACETAMINOPHEN 500 MG
1000 TABLET ORAL ONCE
Refills: 0 | Status: COMPLETED | OUTPATIENT
Start: 2023-12-17 | End: 2023-12-17

## 2023-12-17 RX ORDER — SODIUM CHLORIDE 9 MG/ML
1000 INJECTION INTRAMUSCULAR; INTRAVENOUS; SUBCUTANEOUS ONCE
Refills: 0 | Status: COMPLETED | OUTPATIENT
Start: 2023-12-17 | End: 2023-12-17

## 2023-12-17 RX ADMIN — SODIUM CHLORIDE 1000 MILLILITER(S): 9 INJECTION INTRAMUSCULAR; INTRAVENOUS; SUBCUTANEOUS at 22:11

## 2023-12-17 RX ADMIN — Medication 400 MILLIGRAM(S): at 20:07

## 2023-12-17 RX ADMIN — Medication 1000 MILLIGRAM(S): at 22:11

## 2023-12-17 RX ADMIN — SODIUM CHLORIDE 1000 MILLILITER(S): 9 INJECTION INTRAMUSCULAR; INTRAVENOUS; SUBCUTANEOUS at 20:07

## 2023-12-17 NOTE — ED PROVIDER NOTE - PROGRESS NOTE DETAILS
Renal function improving, covid+ likely contributing.  Tolerating po and encouraged fluid intake at home.  Will dc.

## 2023-12-17 NOTE — ED PROVIDER NOTE - PHYSICAL EXAMINATION
General appearance: Nontoxic appearing, conversant, febrile    Eyes: anicteric sclerae, ZACH, EOMI   HENT: Atraumatic; oropharynx clear, MMM and no ulcerations, no pharyngeal erythema or exudate   Neck: Trachea midline; Full range of motion, supple   Pulm: CTA bl, normal respiratory effort and no intercostal retractions, normal work of breathing   CV: RRR, No murmurs, rubs, or gallops   Abdomen: Soft, non-tender, non-distended; no guarding or rebound   Extremities: No peripheral edema, no gross deformities, FROM x4   Skin: Dry, normal temperature, turgor and texture; no rash   Psych: Appropriate affect, cooperative

## 2023-12-17 NOTE — ED ADULT NURSE NOTE - NSFALLUNIVINTERV_ED_ALL_ED
Bed/Stretcher in lowest position, wheels locked, appropriate side rails in place/Call bell, personal items and telephone in reach/Instruct patient to call for assistance before getting out of bed/chair/stretcher/Non-slip footwear applied when patient is off stretcher/[place] to call system/Physically safe environment - no spills, clutter or unnecessary equipment/Purposeful proactive rounding/Room/bathroom lighting operational, light cord in reach Bed/Stretcher in lowest position, wheels locked, appropriate side rails in place/Call bell, personal items and telephone in reach/Instruct patient to call for assistance before getting out of bed/chair/stretcher/Non-slip footwear applied when patient is off stretcher/[place] to call system/Physically safe environment - no spills, clutter or unnecessary equipment/Purposeful proactive rounding/Room/bathroom lighting operational, light cord in reach

## 2023-12-17 NOTE — ED ADULT NURSE NOTE - OBJECTIVE STATEMENT
covering for primary RN Marlin, received pt in PR9, placed on cardiac monitor.  78 year old  female with PMH colon cancer in remission, MI s/p PCI with stent, HTN, HLD here for generalized body aches and weakness.  pt was recently admitted for same complaints and was diagnosed with diverticulitis.  Pt also c/o congestion, took nyquil pta.  denies any n/v/d.

## 2023-12-17 NOTE — ED PROVIDER NOTE - PATIENT PORTAL LINK FT
You can access the FollowMyHealth Patient Portal offered by [facility] by registering at the following website: [email or web address]. By joining [employer]’s FollowMyHealth portal, you will also be able to view your health information using other applications (apps) compatible with our system. You can access the FollowMyHealth Patient Portal offered by [facility] by registering at the following website: [email or web address]. By joining [employer]’s FollowMyHealth portal, you will also be able to view your health information using other applications (apps) compatible with our system.

## 2023-12-17 NOTE — ED PROVIDER NOTE - CLINICAL SUMMARY MEDICAL DECISION MAKING FREE TEXT BOX
77yo female with pmh colon cancer in remission, MI s/p PCI with stent, HTN, HLD, presenting with body aches, congestion, cough.  Recently admitted here for similar body aches.  States she has had this for a long time and seen at multiple hospitals for this chronic issue but persisted after dc so came back.  Since dc notes dry cough and nasal congestion.  Feels difficulty breathing 2/2 nasal congestion, no cp, sob.  Febrile here.  Denies abdominal pain, nausea, vomiting, urinary symptoms.  Nontender abdomen.  Very well appearing otherwise.  No respiratory distress.  Clinically suspect viral syndrome.  Will xr r/o pna.  Will medicate and reassess.

## 2023-12-17 NOTE — ED PROVIDER NOTE - NSFOLLOWUPINSTRUCTIONS_ED_ALL_ED_FT
You were seen in the Emergency Room and were found to have Covid-19  Rest, drink plenty of fluids  Advance activity as tolerated  Continue all previously prescribed medications as directed  Follow up with your PMD - bring copies of your results  Return to the ER for chest pain, difficulty breathing, worsening symptoms, or other new or concerning symptoms   For pain, you may take Tylenol 975mg every six hours as needed You were seen in the Emergency Room and were found to have Covid-19  Rest, drink plenty of fluids  Advance activity as tolerated  Continue all previously prescribed medications as directed  Follow up with your PMD - bring copies of your results  Return to the ER for chest pain, difficulty breathing, worsening symptoms, or other new or concerning symptoms   For pain, you may take Tylenol 975mg every six hours as needed  You may take over the counter decongestants like mucinex as needed for congestion

## 2023-12-17 NOTE — ED ADULT NURSE NOTE - NSFALLRISKASMTTYPE_ED_ALL_ED

## 2023-12-18 PROBLEM — C18.9 MALIGNANT NEOPLASM OF COLON, UNSPECIFIED: Chronic | Status: ACTIVE | Noted: 2023-12-10

## 2023-12-18 LAB
% ALBUMIN: 53.2 % — SIGNIFICANT CHANGE UP
% ALBUMIN: 53.2 % — SIGNIFICANT CHANGE UP
% ALPHA 1: 6.9 % — SIGNIFICANT CHANGE UP
% ALPHA 1: 6.9 % — SIGNIFICANT CHANGE UP
% ALPHA 2: 13.8 % — SIGNIFICANT CHANGE UP
% ALPHA 2: 13.8 % — SIGNIFICANT CHANGE UP
% BETA: 10.7 % — SIGNIFICANT CHANGE UP
% BETA: 10.7 % — SIGNIFICANT CHANGE UP
% GAMMA: 15.4 % — SIGNIFICANT CHANGE UP
% GAMMA: 15.4 % — SIGNIFICANT CHANGE UP
ALBUMIN SERPL ELPH-MCNC: 3.2 G/DL — LOW (ref 3.6–5.5)
ALBUMIN SERPL ELPH-MCNC: 3.2 G/DL — LOW (ref 3.6–5.5)
ALBUMIN/GLOB SERPL ELPH: 1.1 RATIO — SIGNIFICANT CHANGE UP
ALBUMIN/GLOB SERPL ELPH: 1.1 RATIO — SIGNIFICANT CHANGE UP
ALPHA1 GLOB SERPL ELPH-MCNC: 0.4 G/DL — SIGNIFICANT CHANGE UP (ref 0.1–0.4)
ALPHA1 GLOB SERPL ELPH-MCNC: 0.4 G/DL — SIGNIFICANT CHANGE UP (ref 0.1–0.4)
ALPHA2 GLOB SERPL ELPH-MCNC: 0.8 G/DL — SIGNIFICANT CHANGE UP (ref 0.5–1)
ALPHA2 GLOB SERPL ELPH-MCNC: 0.8 G/DL — SIGNIFICANT CHANGE UP (ref 0.5–1)
B-GLOBULIN SERPL ELPH-MCNC: 0.6 G/DL — SIGNIFICANT CHANGE UP (ref 0.5–1)
B-GLOBULIN SERPL ELPH-MCNC: 0.6 G/DL — SIGNIFICANT CHANGE UP (ref 0.5–1)
GAMMA GLOBULIN: 0.9 G/DL — SIGNIFICANT CHANGE UP (ref 0.6–1.6)
GAMMA GLOBULIN: 0.9 G/DL — SIGNIFICANT CHANGE UP (ref 0.6–1.6)
INTERPRETATION SERPL IFE-IMP: SIGNIFICANT CHANGE UP
INTERPRETATION SERPL IFE-IMP: SIGNIFICANT CHANGE UP
PROT PATTERN SERPL ELPH-IMP: SIGNIFICANT CHANGE UP
PROT PATTERN SERPL ELPH-IMP: SIGNIFICANT CHANGE UP
PROT SERPL-MCNC: 6 G/DL — SIGNIFICANT CHANGE UP (ref 6–8.3)

## 2023-12-18 NOTE — CHART NOTE - NSCHARTNOTEFT_GEN_A_CORE
Unable to leave a voicemail for patient, nor their caregiver as they do not have functioning mailboxes. Will make our final attempt tomorrow.

## 2023-12-31 PROBLEM — Z23 NEED FOR 23-POLYVALENT PNEUMOCOCCAL POLYSACCHARIDE VACCINE: Status: ACTIVE | Noted: 2019-11-19

## 2023-12-31 PROBLEM — Z23 NEED FOR VACCINATION WITH 13-POLYVALENT PNEUMOCOCCAL CONJUGATE VACCINE: Status: ACTIVE | Noted: 2018-07-13

## 2024-01-08 ENCOUNTER — APPOINTMENT (OUTPATIENT)
Dept: GASTROENTEROLOGY | Facility: CLINIC | Age: 79
End: 2024-01-08

## 2024-03-23 NOTE — ED ADULT TRIAGE NOTE - IDEAL BODY WEIGHT(KG)
Pt admitted to [facility] and remained stable overnight and into 3/23/2024. No acute events on tele, remained grossly asymptomatic. EP consulted: pt underwent successful cardioversion on 3/25/2024. Cleared by EP for discharge, and to initiate Flecainide 100mg PO BID after cardioversion and continue diltiazem; hold both 5 days prior to ablation planned outpatient on 4/16 (can keep diltiazem if needed for rate control during this time). Cefdinir provided for UTI. Pt deemed appropriate for discharge; seen and examined prior to departure. Plan discussed with pt, who was agreeable and amenable; medications were discussed and reviewed, outpatient follow-up scheduled, ER precautions were given, all questions were answered to the pt's satisfaction, and Ms. Fuentes was subsequently discharged.    
63

## 2024-04-04 NOTE — ED ADULT NURSE NOTE - AS PAIN REST
Medication: farxiga  Last office visit date: 2/2/24  Medication Refill Protocol Failed.   Hgb A1c less than 8 within last 6 months looking at last value    7 (severe pain)
